# Patient Record
Sex: FEMALE | Race: BLACK OR AFRICAN AMERICAN | NOT HISPANIC OR LATINO | Employment: FULL TIME | URBAN - METROPOLITAN AREA
[De-identification: names, ages, dates, MRNs, and addresses within clinical notes are randomized per-mention and may not be internally consistent; named-entity substitution may affect disease eponyms.]

---

## 2009-10-08 LAB
CHOLEST SERPL-MCNC: 180 MG/DL
HDLC SERPL-MCNC: 53 MG/DL
LDLC SERPL CALC-MCNC: 119 MG/DL
TRIGL SERPL-MCNC: 40 MG/DL

## 2018-06-21 ENCOUNTER — OFFICE VISIT (OUTPATIENT)
Dept: INTERNAL MEDICINE | Facility: CLINIC | Age: 53
End: 2018-06-21
Payer: COMMERCIAL

## 2018-06-21 VITALS
WEIGHT: 199 LBS | DIASTOLIC BLOOD PRESSURE: 81 MMHG | SYSTOLIC BLOOD PRESSURE: 128 MMHG | HEART RATE: 92 BPM | OXYGEN SATURATION: 97 % | RESPIRATION RATE: 20 BRPM

## 2018-06-21 DIAGNOSIS — M54.2 CERVICALGIA: ICD-10-CM

## 2018-06-21 DIAGNOSIS — J47.9 BRONCHIECTASIS WITHOUT COMPLICATION (H): Primary | ICD-10-CM

## 2018-06-21 DIAGNOSIS — Z23 NEED FOR TDAP VACCINATION: ICD-10-CM

## 2018-06-21 DIAGNOSIS — Z13.220 SCREENING FOR HYPERLIPIDEMIA: ICD-10-CM

## 2018-06-21 DIAGNOSIS — I10 BENIGN ESSENTIAL HYPERTENSION: ICD-10-CM

## 2018-06-21 DIAGNOSIS — E89.0 HX OF PARTIAL THYROIDECTOMY: ICD-10-CM

## 2018-06-21 RX ORDER — LISINOPRIL 10 MG/1
10 TABLET ORAL DAILY
Qty: 90 TABLET | Refills: 3 | Status: SHIPPED | OUTPATIENT
Start: 2018-06-21 | End: 2021-01-07

## 2018-06-21 RX ORDER — LISINOPRIL 10 MG/1
10 TABLET ORAL DAILY
COMMUNITY
Start: 2016-03-25 | End: 2018-06-21

## 2018-06-21 RX ORDER — MULTIVITAMIN
1 CAPSULE ORAL PRN
COMMUNITY

## 2018-06-21 RX ORDER — FERROUS SULFATE 325(65) MG
325 TABLET ORAL PRN
COMMUNITY
Start: 2011-02-23

## 2018-06-21 RX ORDER — HYDROCHLOROTHIAZIDE 12.5 MG/1
12.5 CAPSULE ORAL DAILY
Qty: 90 CAPSULE | Refills: 3 | Status: SHIPPED | OUTPATIENT
Start: 2018-06-21

## 2018-06-21 RX ORDER — ALBUTEROL SULFATE 90 UG/1
2 AEROSOL, METERED RESPIRATORY (INHALATION) EVERY 6 HOURS
Qty: 1 INHALER | Refills: 3 | Status: SHIPPED | OUTPATIENT
Start: 2018-06-21

## 2018-06-21 RX ORDER — HYDROCHLOROTHIAZIDE 12.5 MG/1
12.5 CAPSULE ORAL DAILY
COMMUNITY
Start: 2011-02-23 | End: 2018-06-21

## 2018-06-21 ASSESSMENT — ENCOUNTER SYMPTOMS
POSTURAL DYSPNEA: 0
COUGH DISTURBING SLEEP: 0
NECK PAIN: 1
COUGH: 1
HEARTBURN: 1
SPUTUM PRODUCTION: 1
SHORTNESS OF BREATH: 1
BACK PAIN: 1
BLOATING: 1
DYSPNEA ON EXERTION: 1

## 2018-06-21 ASSESSMENT — PAIN SCALES - GENERAL: PAINLEVEL: SEVERE PAIN (7)

## 2018-06-21 NOTE — NURSING NOTE
Chief Complaint   Patient presents with     Establish Care     establish  care/provider- no physical     Referral     want referral to pulmonology   Shiloh Meyer LPN 4:46 PM on 6/21/2018    Patient states has not done anything with Health Maintenance , but has some scheduled.Shiloh Meyer LPN 4:48 PM on 6/21/2018
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
## 2018-06-21 NOTE — MR AVS SNAPSHOT
After Visit Summary   6/21/2018    Sruthi Escobar    MRN: [id number]           Patient Information     Date Of Birth          1965        Visit Information        Provider Department      6/21/2018 4:30 PM Sis Quan, APRN CNP [facility]        Today's Diagnoses     Bronchiectasis without complication (H)    -  1    Cervicalgia        Hx of partial thyroidectomy        Screening for hyperlipidemia        Benign essential hypertension        Need for Tdap vaccination          Care Instructions    Primary Care Center: [phone]     Primary Care Center Medication Refill Request Information:  * Please contact your pharmacy regarding ANY request for medication refills.  ** [facility] Prescription Fax = [phone]  * Please allow 3 business days for routine medication refills.  * Please allow 5 business days for controlled substance medication refills.     Primary Care Center Test Result notification information:  *You will be notified with in 7-10 days of your appointment day regarding the results of your test.  If you are on MyChart you will be notified as soon as the provider has reviewed the results and signed off on them.    To schedule lab appointment [phone].           Follow-ups after your visit        Additional Services     [facility]FERR[facility]       Please call [phone] to make an appointment. Clinic is located: [facility] [address] [place] [address] [id number] [place] [address]      Please complete the following questions:    Procedure/Referral: evaluation and management of left sided neck pain.    What is your diagnosis for the patient's pain? Degenerative spine changes with possible facet involvement x 20 years    What are your specific questions for the pain specialist? Any procedure to relieve pain.    Are there any red flags that may impact the assessment or management of the patient? None             PULMONARY MEDICINE REFERRAL       Your provider has referred you to: [facility]: Lung Disease and Pulmonary Clinic [facility] [phone]   [email or web address]    Please be aware that coverage of these services is subject to the terms and limitations of your health insurance plan.  Call member services at your health plan with any benefit or coverage questions.      Please bring the following with you to your appointment:    (1) Any X-Rays, CTs or MRIs which have been performed.  Contact the facility where they were done to arrange for  prior to your scheduled appointment.    (2) List of current medications   (3) This referral request   (4) Any documents/labs given to you for this referral                  Your next 10 appointments already scheduled     Jun 23, 2018  7:30 AM CDT   LAB with  LAB   [facility] ([facility])    [address] [place] [address]  [address] [facility] [address] [phone]           Please do not eat 10-12 hours before your appointment if you are coming in fasting for labs on lipids, cholesterol, or glucose (sugar). This does not apply to pregnant women. Water, hot tea and black coffee (with nothing added) are okay. Do not drink other fluids, diet soda or chew gum.            Jun 25, 2018  8:20 AM CDT   (Arrive by 8:05 AM)   New Patient Visit with Phuong Naidu MD   [facility] ([facility])    [address] [place] [address]  4th Floor  [facility] [id number] [phone]              Future tests that were ordered for you today     Open Future Orders        Priority Expected Expires Ordered    M Tuberculosis by Quantiferon Routine 6/21/2018 6/21/2019 6/21/2018    Lipid panel reflex to direct LDL Fasting Routine  6/21/2019 6/21/2018    TSH Routine  6/21/2019 6/21/2018            Who to contact     Please call your clinic at [phone]  to:    Ask questions about your health    Make or cancel appointments    Discuss your medicines    Learn about your test results    Speak to your doctor            Additional Information About Your Visit        [employer] Information     [employer] is an electronic gateway that provides easy, online access to your medical records. With [employer], you can request a clinic appointment, read your test results, renew a prescription or communicate with your care team.     To sign up for [employer] visit the website at [email or web address]   You will be asked to enter the access code listed below, as well as some personal information. Please follow the directions to create your username and password.     Your access code is: [email or web address]  Expires: 2018  6:30 AM     Your access code will  in 90 days. If you need help or a new code, please contact your [facility] or call [phone] for assistance.        Care EveryWhere ID     This is your Care EveryWhere ID. This could be used by other organizations to access your [place] medical records  [id number]        Your Vitals Were     Pulse Respirations Pulse Oximetry             92 20 97%          Blood Pressure from Last 3 Encounters:   18 128/81    Weight from Last 3 Encounters:   18 90.3 kg (199 lb)              We Performed the Following     [facility] REFERRAL     PNEUMOCOCCAL VACCINE,ADULT,SQ OR IM     PULMONARY MEDICINE REFERRAL     TDAP VACCINE (BOOSTRIX)          Today's Medication Changes          These changes are accurate as of 18  6:26 PM.  If you have any questions, ask your nurse or doctor.               Start taking these medicines.        Dose/Directions    albuterol 108 (90 Base) MCG/ACT Inhaler   Commonly known as:  PROAIR HFA/PROVENTIL HFA/VENTOLIN HFA   Used for:  Bronchiectasis without complication (H)   Started by:  Sis Quan APRN CNP        Dose:  2 puff    Inhale 2 puffs into the lungs every 6 hours   Quantity:  1 Inhaler   Refills:  3            Where to get your medicines      These medications were sent to [facility] [address] [address]  [place] [address]  [address] [facility] [address]     Phone:  [phone]     albuterol 108 (90 Base) MCG/ACT Inhaler    hydrochlorothiazide 12.5 MG capsule    lisinopril 10 MG tablet                Primary Care Provider    None Specified       No primary provider on file.        Equal Access to Services     [facility]: Hadii shoshana jarquin hadasho Soanastacio, waaxda luqadaha, qaybta kaalmada adestanyaroyal, mannie lino . So [facility] [place] [phone].    ATENCIÓN: Si habla español, tiene a lovell disposición servicios gratuitos de asistencia lingüística. [facility] [phone].    We comply with applicable federal civil rights laws and [place] laws. We do not discriminate on the basis of race, color, national origin, age, disability, sex, sexual orientation, or gender identity.            Thank you!     Thank you for choosing [facility] PRIMARY [facility]ARE CLIN[facility]  for your care. Our goal is always to provide you with excellent care. Hearing back from our patients is one way we can continue to improve our services. Please take a few minutes to complete the written survey that you may receive in the mail after your visit with us. Thank you!             Your Updated Medication List - Protect others around you: Learn how to safely use, store and throw away your medicines at www.disposemymeds.org.          This list is accurate as of 6/21/18  6:26 PM.  Always use your most recent med list.                   Brand Name Dispense Instructions for use Diagnosis    albuterol 108 (90 Base) MCG/ACT Inhaler    PROAIR HFA/PROVENTIL HFA/VENTOLIN HFA    1 Inhaler    Inhale 2 puffs into the lungs every 6 hours    Bronchiectasis without complication (H)       calcium-vitamin D 600-400  MG-UNIT per tablet    CALTRATE     Take 1 tablet by mouth daily        CVS IRON 325 (65 Fe) MG tablet   Generic drug:  ferrous sulfate      Take 325 mg by mouth daily        hydrochlorothiazide 12.5 MG capsule    MICROZIDE    90 capsule    Take 1 capsule (12.5 mg) by mouth daily    Benign essential hypertension       lisinopril 10 MG tablet    PRINIVIL/ZESTRIL    90 tablet    Take 1 tablet (10 mg) by mouth daily    Benign essential hypertension       MULTIvitamin  S Caps      Take 1 capsule by mouth daily

## 2018-06-22 NOTE — TELEPHONE ENCOUNTER
FUTURE VISIT INFORMATION      FUTURE VISIT INFORMATION:    Date: 6/25/18    Time:     Location: [facility]  REFERRAL INFORMATION:    Referring provider:  Sis Quan    Referring providers clinic:  [facility]    Reason for visit/diagnosis  Cervicalgia, per pt with [facility], records and referral in Epic    RECORDS REQUESTED FROM:       Clinic name Comments Records Status Imaging Status    Sis Quan referring  internal internal                                   RECORDS STATUS

## 2018-06-25 ENCOUNTER — PRE VISIT (OUTPATIENT)
Dept: ANESTHESIOLOGY | Facility: CLINIC | Age: 53
End: 2018-06-25

## 2018-06-29 DIAGNOSIS — Z13.220 SCREENING FOR HYPERLIPIDEMIA: ICD-10-CM

## 2018-06-29 DIAGNOSIS — J47.9 BRONCHIECTASIS WITHOUT COMPLICATION (H): ICD-10-CM

## 2018-06-29 DIAGNOSIS — E89.0 HX OF PARTIAL THYROIDECTOMY: ICD-10-CM

## 2018-06-29 LAB
CHOLEST SERPL-MCNC: 216 MG/DL
HDLC SERPL-MCNC: 70 MG/DL
LDLC SERPL CALC-MCNC: 136 MG/DL
NONHDLC SERPL-MCNC: 146 MG/DL
TRIGL SERPL-MCNC: 50 MG/DL
TSH SERPL DL<=0.005 MIU/L-ACNC: 1.51 MU/L (ref 0.4–4)

## 2018-07-02 LAB
M TB TUBERC IFN-G BLD QL: NEGATIVE
M TB TUBERC IFN-G/MITOGEN IGNF BLD: 0.07 IU/ML

## 2018-07-10 ENCOUNTER — TELEPHONE (OUTPATIENT)
Dept: INTERNAL MEDICINE | Facility: CLINIC | Age: 53
End: 2018-07-10

## 2018-07-10 NOTE — TELEPHONE ENCOUNTER
"[facility] Call Center    Phone Message    May a detailed message be left on voicemail: yes    Reason for Call: Requesting Results   Name/type of test: Labs; pt states she does not have the results of the \"quantiferon\" test, and needs the results of all labs printed and mailed to her so she can have them for work/school.  Date of test: 6/29/2018  Was test done at a location other than [facility] (Please fill in the location if not [facility])?: No    Action Taken: Message routed to:  [facility] ([facility]): BENNIE  "

## 2018-07-10 NOTE — LETTER
Patient:  Sruthi Escobar  :   1965  MRN:     [id number]        Ms. Sruthi Escobar  [address] [address]        July 10, 2018    Dear Ms. Escobar,    Thank you for choosing the [facility] for your healthcare needs.  We appreciate the opportunity to serve you.    The following are your recent test results.     Results for orders placed or performed in visit on 18   M Tuberculosis by Quantiferon   Result Value Ref Range    M Tuberculosis Result Negative NEG^Negative    M Tuberculosis Antigen Value 0.07 IU/mL   Lipid panel reflex to direct LDL Fasting   Result Value Ref Range    Cholesterol 216 (H) <200 mg/dL    Triglycerides 50 <150 mg/dL    HDL Cholesterol 70 >49 mg/dL    LDL Cholesterol Calculated 136 (H) <100 mg/dL    Non HDL Cholesterol 146 (H) <130 mg/dL   TSH   Result Value Ref Range    TSH 1.51 0.40 - 4.00 mU/L             Please contact your provider if you have any questions or concerns.  We look forward to serving your needs in the future.      Sincerely,        Sis Quan MS, RN, CT, ANP-BC, Adult Nurse Practitioner  sr

## 2018-11-15 DIAGNOSIS — R91.8 LUNG NODULES: Primary | ICD-10-CM

## 2018-11-17 NOTE — TELEPHONE ENCOUNTER
RECORDS STATUS - ALL OTHER DIAGNOSIS      RECORDS RECEIVED FROM: Pending   DATE RECEIVED: [place]   NOTES STATUS DETAILS   OFFICE NOTE from referring provider Complete CE   OFFICE NOTE from medical oncologist     DISCHARGE SUMMARY from hospital Complete CE   DISCHARGE REPORT from the ER     OPERATIVE REPORT     MEDICATION LIST     CLINICAL TRIAL TREATMENTS TO DATE     LABS     PATHOLOGY REPORTS     ANYTHING RELATED TO DIAGNOSIS     GENONOMIC TESTING     TYPE:     IMAGING (NEED IMAGES & REPORT)     CT SCANS Pending [facility]   MRI     MAMMO     ULTRASOUND     PET

## 2018-11-20 ENCOUNTER — PATIENT OUTREACH (OUTPATIENT)
Dept: CARE COORDINATION | Facility: CLINIC | Age: 53
End: 2018-11-20

## 2018-11-20 NOTE — TELEPHONE ENCOUNTER
Cindy from [facility] has  Pushed   records into PACS - name hyphenated in pacs- Sruthi Bruno A ( as seen in PACS )

## 2018-11-21 ENCOUNTER — OFFICE VISIT (OUTPATIENT)
Dept: PULMONOLOGY | Facility: CLINIC | Age: 53
End: 2018-11-21
Attending: INTERNAL MEDICINE
Payer: COMMERCIAL

## 2018-11-21 ENCOUNTER — PRE VISIT (OUTPATIENT)
Dept: PULMONOLOGY | Facility: CLINIC | Age: 53
End: 2018-11-21

## 2018-11-21 ENCOUNTER — RADIANT APPOINTMENT (OUTPATIENT)
Dept: CT IMAGING | Facility: CLINIC | Age: 53
End: 2018-11-21
Attending: CLINICAL NURSE SPECIALIST
Payer: COMMERCIAL

## 2018-11-21 VITALS
RESPIRATION RATE: 16 BRPM | BODY MASS INDEX: 34.38 KG/M2 | OXYGEN SATURATION: 99 % | HEIGHT: 63 IN | DIASTOLIC BLOOD PRESSURE: 88 MMHG | TEMPERATURE: 98.6 F | SYSTOLIC BLOOD PRESSURE: 157 MMHG | WEIGHT: 194 LBS | HEART RATE: 83 BPM

## 2018-11-21 DIAGNOSIS — J20.9 ACUTE BRONCHITIS, UNSPECIFIED ORGANISM: Primary | ICD-10-CM

## 2018-11-21 DIAGNOSIS — D72.819 CHRONIC LEUKOPENIA: ICD-10-CM

## 2018-11-21 DIAGNOSIS — R91.8 LUNG NODULES: ICD-10-CM

## 2018-11-21 DIAGNOSIS — J20.9 ACUTE BRONCHITIS, UNSPECIFIED ORGANISM: ICD-10-CM

## 2018-11-21 LAB
BACTERIA SPEC CULT: ABNORMAL
BASOPHILS # BLD AUTO: 0 10E9/L (ref 0–0.2)
BASOPHILS NFR BLD AUTO: 0.7 %
CRP SERPL-MCNC: <2.9 MG/L (ref 0–8)
DIFFERENTIAL METHOD BLD: ABNORMAL
EOSINOPHIL # BLD AUTO: 0.1 10E9/L (ref 0–0.7)
EOSINOPHIL NFR BLD AUTO: 1.4 %
ERYTHROCYTE [DISTWIDTH] IN BLOOD BY AUTOMATED COUNT: 14.3 % (ref 10–15)
ERYTHROCYTE [SEDIMENTATION RATE] IN BLOOD BY WESTERGREN METHOD: 28 MM/H (ref 0–30)
GRAM STN SPEC: ABNORMAL
HCT VFR BLD AUTO: 39.2 % (ref 35–47)
HGB BLD-MCNC: 12.3 G/DL (ref 11.7–15.7)
IMM GRANULOCYTES # BLD: 0 10E9/L (ref 0–0.4)
IMM GRANULOCYTES NFR BLD: 0.2 %
LYMPHOCYTES # BLD AUTO: 2.2 10E9/L (ref 0.8–5.3)
LYMPHOCYTES NFR BLD AUTO: 53.3 %
Lab: ABNORMAL
MCH RBC QN AUTO: 26.6 PG (ref 26.5–33)
MCHC RBC AUTO-ENTMCNC: 31.4 G/DL (ref 31.5–36.5)
MCV RBC AUTO: 85 FL (ref 78–100)
MONOCYTES # BLD AUTO: 0.4 10E9/L (ref 0–1.3)
MONOCYTES NFR BLD AUTO: 10.4 %
NEUTROPHILS # BLD AUTO: 1.4 10E9/L (ref 1.6–8.3)
NEUTROPHILS NFR BLD AUTO: 34 %
NRBC # BLD AUTO: 0 10*3/UL
NRBC BLD AUTO-RTO: 0 /100
PLATELET # BLD AUTO: 232 10E9/L (ref 150–450)
RBC # BLD AUTO: 4.62 10E12/L (ref 3.8–5.2)
SPECIMEN SOURCE: ABNORMAL
SPECIMEN SOURCE: ABNORMAL
WBC # BLD AUTO: 4.2 10E9/L (ref 4–11)

## 2018-11-21 PROCEDURE — G0463 HOSPITAL OUTPT CLINIC VISIT: HCPCS | Mod: ZF

## 2018-11-21 PROCEDURE — 82784 ASSAY IGA/IGD/IGG/IGM EACH: CPT

## 2018-11-21 PROCEDURE — 87070 CULTURE OTHR SPECIMN AEROBIC: CPT

## 2018-11-21 PROCEDURE — 82784 ASSAY IGA/IGD/IGG/IGM EACH: CPT | Performed by: INTERNAL MEDICINE

## 2018-11-21 PROCEDURE — 86140 C-REACTIVE PROTEIN: CPT

## 2018-11-21 PROCEDURE — 82787 IGG 1 2 3 OR 4 EACH: CPT | Performed by: INTERNAL MEDICINE

## 2018-11-21 PROCEDURE — 85652 RBC SED RATE AUTOMATED: CPT

## 2018-11-21 PROCEDURE — 87102 FUNGUS ISOLATION CULTURE: CPT | Performed by: INTERNAL MEDICINE

## 2018-11-21 PROCEDURE — 87205 SMEAR GRAM STAIN: CPT

## 2018-11-21 PROCEDURE — 87210 SMEAR WET MOUNT SALINE/INK: CPT | Performed by: INTERNAL MEDICINE

## 2018-11-21 PROCEDURE — 82785 ASSAY OF IGE: CPT

## 2018-11-21 PROCEDURE — 85025 COMPLETE CBC W/AUTO DIFF WBC: CPT

## 2018-11-21 ASSESSMENT — PAIN SCALES - GENERAL: PAINLEVEL: MODERATE PAIN (5)

## 2018-11-21 NOTE — NURSING NOTE
"Oncology Rooming Note    November 21, 2018 2:41 PM   Sruthi Escobar is a 53 year old female who presents for:    Chief Complaint   Patient presents with     Oncology Clinic Visit     New patient; Lung Nodule     Initial Vitals: /88  Pulse 83  Temp 98.6  F (37  C) (Oral)  Resp 16  Ht 1.6 m (5' 3\")  Wt 88 kg (194 lb)  SpO2 99%  BMI 34.37 kg/m2 Estimated body mass index is 34.37 kg/(m^2) as calculated from the following:    Height as of this encounter: 1.6 m (5' 3\").    Weight as of this encounter: 88 kg (194 lb). Body surface area is 1.98 meters squared.  Moderate Pain (5) Comment: left neck and upper back   No LMP recorded. Patient is postmenopausal.  Allergies reviewed: Yes  Medications reviewed: Yes    Medications: Medication refills not needed today.  Pharmacy name entered into [employer]: [facility] [address] [place] [address]  AT [facility] [address]    Clinical concerns: lung nodule; CT scan result     6 minutes for nursing intake (face to face time)     Tamia Angeles CMA              "

## 2018-11-21 NOTE — PATIENT INSTRUCTIONS
Colorectal Cancer Screening: During our visit today, we discussed that it is recommended you receive colorectal cancer screening. Please call or make an appointment with your primary care provider to discuss this. You may also call the [employer] scheduling line ([phone]) to set up a colonoscopy appointment.

## 2018-11-21 NOTE — LETTER
11/21/2018       RE: Sruthi Escobar  [address] [address]     Dear Colleague,    Thank you for referring your patient, Sruthi Escobar, to the [facility] at [facility]. Please see a copy of my visit note below.    [facility]  Lung Nodule Clinic Note  November 21, 2018    Chief complaint:  Sruthi Escobar is a 53 year old female seen in the Pulmonary Clinic  for   Chief Complaint   Patient presents with     Oncology Clinic Visit     New patient; Lung Nodule     Assessment and Plan:  1. right lower lobe pleural-based nodular density which has been stable when compared to previous CT scans.  There is also a cystic lesion in the right lower lobe pleural-based which also has not changed in size and since 2016.  2.  Frequent upper respiratory tract infections requiring antibiotic treatment averaging once a month for the last couple of years.  I will order immunoglobulin levels, ESR, CRP.  Patient also has low hemoglobin and WBC chronically.  She has never been worked up for this.  I will repeat CBC and sent her to hematology for further evaluation most likely.  Also ordered sputum cultures, Gram stain KOH prep.      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  I reviewed her CT scan with her that revealed no chronic changes such as bronchiectasis.  She was most recently treated with azithromycin and Keflex for her upper respiratory tract infection and she tells me that whenever she sees a patient with infection she catches.  She does not have any other infections and any other systems such as urinary tract infection no history of opportunistic infections in the past.    Exposure history: Asbestos;  No , TB;  No , Radiation;   No     Allergies   Allergen Reactions     Prochlorperazine Anxiety and Itching      Phenothiazines Rash     Sulfa Drugs Rash        Past Medical History:   Diagnosis Date     Bronchiectasis (H)      Gastroesophageal reflux disease      Hypertension      Pain in shoulder region, left         No past surgical history on file.     Social History     Social History     Marital status:      Spouse name: N/A     Number of children: N/A     Years of education: N/A     Occupational History     Not on file.     Social History Main Topics     Smoking status: Never Smoker     Smokeless tobacco: Never Used     Alcohol use No     Drug use: No     Sexual activity: Yes     Partners: Male     Other Topics Concern     Not on file     Social History Narrative        Family History   Problem Relation Age of Onset     Hypertension Mother      Uterine Cancer Mother      HEART DISEASE Father      Hypertension Maternal Grandmother      HEART DISEASE Maternal Grandfather         Immunization History   Administered Date(s) Administered     HepA, Unspecified 03/05/2012     MMR 11/01/1993     Pneumococcal 23 valent 06/21/2018     TDAP Vaccine (Boostrix) 06/21/2018       Current Outpatient Prescriptions   Medication Sig     calcium-vitamin D (CALTRATE) 600-400 MG-UNIT per tablet Take 1 tablet by mouth daily     ferrous sulfate (CVS IRON) 325 (65 Fe) MG tablet Take 325 mg by mouth daily     hydrochlorothiazide (MICROZIDE) 12.5 MG capsule Take 1 capsule (12.5 mg) by mouth daily     lisinopril (PRINIVIL/ZESTRIL) 10 MG tablet Take 1 tablet (10 mg) by mouth daily     Multiple Vitamin (MULTIVITAMIN  S) CAPS Take 1 capsule by mouth daily     albuterol (PROAIR HFA/PROVENTIL HFA/VENTOLIN HFA) 108 (90 Base) MCG/ACT Inhaler Inhale 2 puffs into the lungs every 6 hours (Patient not taking: Reported on 11/21/2018)     No current facility-administered medications for this visit.         Review of Systems:  I have done 10 points of review systems and pertinent findings are cough  ,otherwise negative.    Physical  examination  Constitutional: Alert, oriented, not in distress  Vitals: B/P: 157/88, T: 98.6, P: 83, R: 16  Eyes: No icterus, nystagmus, pupils isocoric  ENT/Mouth:  No ear discharge, moist mucosa, no ulceration, tonsillar hypertrophy  Cardiovascular: Normal S1 and S2, no additional heart sounds, murmur, rub, normal peripheral pulses  Respiratory: Both hemithoraces are symmetrical, normal to palpation, no dullness to percussion, auscultation of lungs revealed decreased bronchovesicular sounds, expirium prolongation, wheezing, rhonci and crackles  Gastrointestinal/Rectal: Bowel sounds present, soft, non tender, non distended, no organomegaly, ascitis, mass  Musculoskeletal: Normal muscle mass, no dephormity  Integumentary:  No rash, normal texture and turgor, no edema  Neurological: Alert, orientedx3, no motor deficits, cranial nerves grossly normal  Psychiatric:  Mood and affect are appropriate with insight into his/her medical condition  Hematologic/Immunologic/Lymphatic: No bruise, no lymph node enargement       Thank you for allowing me participate in the care of Sruthi Escobar.    NATTY Barragan MD

## 2018-11-21 NOTE — MR AVS SNAPSHOT
After Visit Summary   11/21/2018    Sruthi Escobar    MRN: [id number]           Patient Information     Date Of Birth          1965        Visit Information        Provider Department      11/21/2018 2:40 PM Louis Barragan MD [facility]        Today's Diagnoses     Acute bronchitis, unspecified organism    -  1    Chronic leukopenia          Care Instructions    Colorectal Cancer Screening: During our visit today, we discussed that it is recommended you receive colorectal cancer screening. Please call or make an appointment with your primary care provider to discuss this. You may also call the [facility] scheduling line ([phone]) to set up a colonoscopy appointment.            Follow-ups after your visit        Additional Services     ONC/HEME ADULT REFERRAL       Your provider has referred you to: [facility]: Cancer Care/Hematology (All Cancer Related Services) - [address] [phone]   [email or web address]    Please be aware that coverage of these services is subject to the terms and limitations of your health insurance plan.  Call member services at your health plan with any benefit or coverage questions.      Please bring the following with you to your appointment:    (1) Any X-Rays, CTs or MRIs which have been performed.  Contact the facility where they were done to arrange for  prior to your scheduled appointment.   (2) List of current medications  (3) This referral request   (4) Any documents/labs given to you for this referral                  Who to contact     If you have questions or need follow up information about today's clinic visit or your schedule please contact [facility] directly at [phone].  Normal or non-critical lab and imaging results will be communicated to you by MyChart, letter or phone within 4 business days after the clinic has received the results. If you do  "not hear from us within 7 days, please contact the clinic through [employer] or phone. If you have a critical or abnormal lab result, we will notify you by phone as soon as possible.  Submit refill requests through [employer] or call your pharmacy and they will forward the refill request to us. Please allow 3 business days for your refill to be completed.          Additional Information About Your Visit        [employer] Information     [employer] lets you send messages to your doctor, view your test results, renew your prescriptions, schedule appointments and more. To sign up, go to [email or web address] . Click on \"Log in\" on the left side of the screen, which will take you to the Welcome page. Then click on \"Sign up Now\" on the right side of the page.     You will be asked to enter the access code listed below, as well as some personal information. Please follow the directions to create your username and password.     Your access code is: [email or web address]  Expires: 2019  3:00 PM     Your access code will  in 90 days. If you need help or a new code, please call your [place] clinic or [phone].        Care EveryWhere ID     This is your Care EveryWhere ID. This could be used by other organizations to access your [place] medical records  [id number]        Your Vitals Were     Pulse Temperature Respirations Height Pulse Oximetry BMI (Body Mass Index)    83 98.6  F (37  C) (Oral) 16 1.6 m (5' 3\") 99% 34.37 kg/m2       Blood Pressure from Last 3 Encounters:   18 157/88   18 128/81    Weight from Last 3 Encounters:   18 88 kg (194 lb)   18 90.3 kg (199 lb)              We Performed the Following     Fungus Culture, non-blood     Immunoglobulin G subclasses     Koh prep     ONC/HEME ADULT REFERRAL        Primary Care Provider    None Specified       No primary provider on file.        Equal Access to Services     SALLIE AHNSON AH: Joanna Corado, enma bennett, joe lang " mannie lynnstan onofreaan ah. So [facility] [phone].    ATENCIÓN: Si habla jaime, tiene a lovell disposición servicios gratuitos de asistencia lingüística. Marcia al [phone].    We comply with applicable federal civil rights laws and [place] laws. We do not discriminate on the basis of race, color, national origin, age, disability, sex, sexual orientation, or gender identity.            Thank you!     Thank you for choosing [facility]IN[facility]  for your care. Our goal is always to provide you with excellent care. Hearing back from our patients is one way we can continue to improve our services. Please take a few minutes to complete the written survey that you may receive in the mail after your visit with us. Thank you!             Your Updated Medication List - Protect others around you: Learn how to safely use, store and throw away your medicines at www.disposemymeds.org.          This list is accurate as of 11/21/18 11:59 PM.  Always use your most recent med list.                   Brand Name Dispense Instructions for use Diagnosis    albuterol 108 (90 Base) MCG/ACT inhaler    PROAIR HFA/PROVENTIL HFA/VENTOLIN HFA    1 Inhaler    Inhale 2 puffs into the lungs every 6 hours    Bronchiectasis without complication (H)       calcium carbonate 600 mg-vitamin D 400 units 600-400 MG-UNIT per tablet    CALTRATE     Take 1 tablet by mouth daily        CVS IRON 325 (65 Fe) MG tablet   Generic drug:  ferrous sulfate      Take 325 mg by mouth daily        hydrochlorothiazide 12.5 MG capsule    MICROZIDE    90 capsule    Take 1 capsule (12.5 mg) by mouth daily    Benign essential hypertension       lisinopril 10 MG tablet    PRINIVIL/ZESTRIL    90 tablet    Take 1 tablet (10 mg) by mouth daily    Benign essential hypertension       MULTIvitamin  S capsule      Take 1 capsule by mouth daily

## 2018-11-21 NOTE — PROGRESS NOTES
[facility]  Lung Nodule Clinic Note  November 21, 2018    Chief complaint:  Sruthi Escobar is a 53 year old female seen in the Pulmonary Clinic  for   Chief Complaint   Patient presents with     Oncology Clinic Visit     New patient; Lung Nodule     Assessment and Plan:  1. right lower lobe pleural-based nodular density which has been stable when compared to previous CT scans.  There is also a cystic lesion in the right lower lobe pleural-based which also has not changed in size and since 2016.  2.  Frequent upper respiratory tract infections requiring antibiotic treatment averaging once a month for the last couple of years.  I will order immunoglobulin levels, ESR, CRP.  Patient also has low hemoglobin and WBC chronically.  She has never been worked up for this.  I will repeat CBC and sent her to hematology for further evaluation most likely.  Also ordered sputum cultures, Gram stain KOH prep.      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  I reviewed her CT scan with her that revealed no chronic changes such as bronchiectasis.  She was most recently treated with azithromycin and Keflex for her upper respiratory tract infection and she tells me that whenever she sees a patient with infection she catches.  She does not have any other infections and any other systems such as urinary tract infection no history of opportunistic infections in the past.    Exposure history: Asbestos;  No , TB;  No , Radiation;   No     Allergies   Allergen Reactions     Prochlorperazine Anxiety and Itching     Phenothiazines Rash     Sulfa Drugs Rash        Past Medical History:   Diagnosis Date     Bronchiectasis (H)      Gastroesophageal reflux disease      Hypertension      Pain in shoulder region, left         No past surgical history on file.     Social History     Social History     Marital  status:      Spouse name: N/A     Number of children: N/A     Years of education: N/A     Occupational History     Not on file.     Social History Main Topics     Smoking status: Never Smoker     Smokeless tobacco: Never Used     Alcohol use No     Drug use: No     Sexual activity: Yes     Partners: Male     Other Topics Concern     Not on file     Social History Narrative        Family History   Problem Relation Age of Onset     Hypertension Mother      Uterine Cancer Mother      HEART DISEASE Father      Hypertension Maternal Grandmother      HEART DISEASE Maternal Grandfather         Immunization History   Administered Date(s) Administered     HepA, Unspecified 03/05/2012     MMR 11/01/1993     Pneumococcal 23 valent 06/21/2018     TDAP Vaccine (Boostrix) 06/21/2018       Current Outpatient Prescriptions   Medication Sig     calcium-vitamin D (CALTRATE) 600-400 MG-UNIT per tablet Take 1 tablet by mouth daily     ferrous sulfate (CVS IRON) 325 (65 Fe) MG tablet Take 325 mg by mouth daily     hydrochlorothiazide (MICROZIDE) 12.5 MG capsule Take 1 capsule (12.5 mg) by mouth daily     lisinopril (PRINIVIL/ZESTRIL) 10 MG tablet Take 1 tablet (10 mg) by mouth daily     Multiple Vitamin (MULTIVITAMIN  S) CAPS Take 1 capsule by mouth daily     albuterol (PROAIR HFA/PROVENTIL HFA/VENTOLIN HFA) 108 (90 Base) MCG/ACT Inhaler Inhale 2 puffs into the lungs every 6 hours (Patient not taking: Reported on 11/21/2018)     No current facility-administered medications for this visit.         Review of Systems:  I have done 10 points of review systems and pertinent findings are cough  ,otherwise negative.    Physical examination  Constitutional: Alert, oriented, not in distress  Vitals: B/P: 157/88, T: 98.6, P: 83, R: 16  Eyes: No icterus, nystagmus, pupils isocoric  ENT/Mouth:  No ear discharge, moist mucosa, no ulceration, tonsillar hypertrophy  Cardiovascular: Normal S1 and S2, no additional heart sounds, murmur, rub,  normal peripheral pulses  Respiratory: Both hemithoraces are symmetrical, normal to palpation, no dullness to percussion, auscultation of lungs revealed decreased bronchovesicular sounds, expirium prolongation, wheezing, rhonci and crackles  Gastrointestinal/Rectal: Bowel sounds present, soft, non tender, non distended, no organomegaly, ascitis, mass  Musculoskeletal: Normal muscle mass, no dephormity  Integumentary:  No rash, normal texture and turgor, no edema  Neurological: Alert, orientedx3, no motor deficits, cranial nerves grossly normal  Psychiatric:  Mood and affect are appropriate with insight into his/her medical condition  Hematologic/Immunologic/Lymphatic: No bruise, no lymph node enargement       Thank you for allowing me participate in the care of Sruthi Escobar.    NATTY Barragan MD

## 2018-11-22 LAB
KOH PREP SPEC: NORMAL
SPECIMEN SOURCE: NORMAL

## 2018-11-23 LAB
FUNGUS SPEC CULT: ABNORMAL
FUNGUS SPEC CULT: ABNORMAL
IGA SERPL-MCNC: 215 MG/DL (ref 70–380)
IGE SERPL-ACNC: 16 KIU/L (ref 0–114)
IGG SERPL-MCNC: 1620 MG/DL (ref 695–1620)
IGM SERPL-MCNC: 90 MG/DL (ref 60–265)
SPECIMEN SOURCE: ABNORMAL

## 2018-11-26 ENCOUNTER — TELEPHONE (OUTPATIENT)
Dept: ONCOLOGY | Facility: CLINIC | Age: 53
End: 2018-11-26

## 2018-11-26 ENCOUNTER — TELEPHONE (OUTPATIENT)
Dept: SURGERY | Facility: CLINIC | Age: 53
End: 2018-11-26

## 2018-11-26 NOTE — TELEPHONE ENCOUNTER
Sruthi was called and notified of the the upcoming appointment with Dr. Saucedo.  We reviewed Dr. Barragan's recommendations and lab results

## 2018-11-26 NOTE — TELEPHONE ENCOUNTER
----- Message from Maria Teresa Garcia sent at 11/26/2018  7:44 AM CST -----  Please respond to Dr. Barragan and Rachel Russell.    ThanksHelena  ----- Message -----     From: Louis Barragan MD     Sent: 11/25/2018   9:31 PM       To: Rachel Russell, APRN CNS, #    Hi,  Can you please schedule her w Hematology (initial new patient, diagnosis: leukopenia) and let her know?  Thankslakisha

## 2018-11-27 LAB
IGG SERPL-MCNC: 1680 MG/DL (ref 695–1620)
IGG1 SER-MCNC: 773 MG/DL (ref 300–856)
IGG2 SER-MCNC: 865 MG/DL (ref 158–761)
IGG3 SER-MCNC: 50 MG/DL (ref 24–192)
IGG4 SER-MCNC: 29 MG/DL (ref 11–86)

## 2018-12-17 NOTE — PROGRESS NOTES
[facility]  HEMATOLOGY AND MEDICAL ONCOLOGY    CONSULTATION    PATIENT NAME: Sruthi Escobar   MRN# [id number]     Date of Visit: Dec 18, 2018    Referring Provider: Louis Barragan MD  [address]  [place] [address] YOB: 1965     Reason for consult: leukopenia    CHIEF COMPLAINT   Frequent infections     HISTORY OF PRESENTING ILLNESS     Mrs. Escobar is a 52yo woman who is referred for work up of leukopenia. 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 She states that she gets frequent upper respiratory tract infections (URIs) when she encounters patients with URIs. She was recently seen by the pulmonary service who requested a hematology consult. Per pulmonary note she has chronic anemia and leukopenia. Lab review from 11/21/2018 with WBC 4.2x10^3/uL, Hemoglobin 12.3g/dL, platelet 232 x10^3/uL, MCV 85fL, absolute neutrophils 1.4x10^3/uL, normal ESR 28mm/hr, normal ESR <2.9mg/L and normal antibody levels. Lab review from 2015 with WBC 3.6, ANC 2.5x10^3/uL and ALC 0.9x10^3/uL. CT chest on 11/21/2018 with mild central bronchiectasis, few scattered pulmonary nodules and a right middle lobe cyst.     12/18/2018: She presents to clinic for first appointment. She feels well. She takes augmentin for a sinus infection which manifested with nasal congestion, fever and fatigue. She denies any cervical or axillary lymphadenopathy. She reports that she is getting frequent URI almost every 1-3 moths since 2016. She reports chronic anemia for the last 20 years because of low iron. She reports that the anemia improved with oral iron ferrous sulfate 325mg BID. She used to have heavy periods. She is currently in menopause  She denies any prior admissions to the hospital for IV antibiotics. She is originally from [facility] [place]. Her son was recently diagnosed with beta thalassemia trait       PAST MEDICAL HISTORY     Past Medical History:   Diagnosis  Date     Bronchiectasis (H)      Gastroesophageal reflux disease      Hypertension      Pain in shoulder region, left         PAST SURGICAL HISTORY     Partial thyroidectomy  2-c sections        CURRENT OUTPATIENT MEDICATIONS     Current Outpatient Medications   Medication Sig     albuterol (PROAIR HFA/PROVENTIL HFA/VENTOLIN HFA) 108 (90 Base) MCG/ACT Inhaler Inhale 2 puffs into the lungs every 6 hours (Patient not taking: Reported on 11/21/2018)     calcium-vitamin D (CALTRATE) 600-400 MG-UNIT per tablet Take 1 tablet by mouth daily     ferrous sulfate (CVS IRON) 325 (65 Fe) MG tablet Take 325 mg by mouth daily     hydrochlorothiazide (MICROZIDE) 12.5 MG capsule Take 1 capsule (12.5 mg) by mouth daily     lisinopril (PRINIVIL/ZESTRIL) 10 MG tablet Take 1 tablet (10 mg) by mouth daily     Multiple Vitamin (MULTIVITAMIN  S) CAPS Take 1 capsule by mouth daily     No current facility-administered medications for this visit.         ALLERGIES     Allergies   Allergen Reactions     Prochlorperazine Anxiety and Itching     Phenothiazines Rash     Sulfa Drugs Rash     .     SOCIAL HISTORY     Social History     Socioeconomic History     Marital status:      Spouse name: Not on file     Number of children: Not on file     Years of education: Not on file     Highest education level: Not on file   Social Needs     Financial resource strain: Not on file     Food insecurity - worry: Not on file     Food insecurity - inability: Not on file     Transportation needs - medical: Not on file     Transportation needs - non-medical: Not on file   Occupational History     Not on file   Tobacco Use     Smoking status: Never Smoker     Smokeless tobacco: Never Used   Substance and Sexual Activity     Alcohol use: No     Drug use: No     Sexual activity: Yes     Partners: Male   Other Topics Concern     Not on file   Social History Narrative     Not on file          FAMILY HISTORY     Family History   Problem Relation Age of Onset  "    Hypertension Mother      Uterine Cancer Mother      Heart Disease Father      Hypertension Maternal Grandmother      Heart Disease Maternal Grandfather           REVIEW OF SYSTEMS   Pertinent positives have been included in HPI;  Review Of Systems  General: as per hpi  Skin: negative for rash  Eyes: negative for visual blurring  Ears/Nose/Throat: negative for hearing loss  Respiratory: No shortness of breath, dyspnea on exertion, cough, or hemoptysis  Cardiovascular: negative for palpitations and tachycardia  Gastrointestinal: negative for nausea, vomiting and abdominal pain  Genitourinary: negative for nocturia and dysuria  Musculoskeletal: negative for muscular pain or bone pain  Neurologic: negative for migraine headaches  Psychiatric: negative for anxiety  Hematologic/Lymphatic/Immunologic: as per hpi  Endocrine: hx of goiter-no current symptoms of hypothyroidism       PHYSICAL EXAM   /89   Pulse 88   Temp 99.1  F (37.3  C) (Oral)   Resp 16   Ht 1.6 m (5' 3\")   Wt 87.5 kg (193 lb)   SpO2 99%   BMI 34.19 kg/m    General appearance: pleasant woman, not in acute distress  Eyes, Ears, Nose, Throat & Mouth:pupils equal and reactive to light and accommodation, extraocular movements intact, no icterus, injection or pallor. Oropharynx is clear.  Neck: supple, see hem  Respiratory: clear to auscultation bilaterally  Cardiovascular: regular, no murmurs, rubs, or gallops  Gastrointenstinal: soft, non-tender, non-distended, normal bowel sounds, no hepatosplenomegaly  Extremities: warm, well perfused, no edema  Neurologic: Alert and oriented to person, place and time, Cranial nerves 2-12 intact, intact sensation to light touch, muscle strength 5/5 in 4 extremities, reflexes +2   Skin: no rash  Hematologic/Lymph: no cervical, axillary or inguinal lymphadenopathy     LABORATORY AND IMAGING STUDIES       Recent Labs   Lab Test 11/21/18  1539   WBC 4.2   RBC 4.62   HGB 12.3   HCT 39.2   MCV 85   MCH 26.6   MCHC " 31.4*   RDW 14.3      NEUTROPHIL 34.0   ANEU 1.4*   ALYM 2.2   CHUYITA 0.4   AEOS 0.1       Recent Labs   Lab Test 11/21/18  1539   IGG 1,680*  1,620   IGM 90   IGE 16           ECOG PS: 0   ASSESSMENT AND RECOMMENDATIONS     Mrs. Escobar is a 54yo woman who is referred for work up of leukopenia. She has a history of frequent upper respiratory tract infections (URIs). Lab review from 11/21/2018  with mild neutropenia which appears new since 2015, albeit she had leukopenia in 2015. 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 Less common causes include viral infections, nutritional deficiencies, rheumatologic disorders, and also hematologic conditions. 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

## 2018-12-18 ENCOUNTER — ONCOLOGY VISIT (OUTPATIENT)
Dept: ONCOLOGY | Facility: CLINIC | Age: 53
End: 2018-12-18
Attending: INTERNAL MEDICINE
Payer: COMMERCIAL

## 2018-12-18 ENCOUNTER — PRE VISIT (OUTPATIENT)
Dept: ONCOLOGY | Facility: CLINIC | Age: 53
End: 2018-12-18

## 2018-12-18 VITALS
SYSTOLIC BLOOD PRESSURE: 127 MMHG | HEIGHT: 63 IN | DIASTOLIC BLOOD PRESSURE: 89 MMHG | HEART RATE: 88 BPM | BODY MASS INDEX: 34.2 KG/M2 | TEMPERATURE: 99.1 F | OXYGEN SATURATION: 99 % | WEIGHT: 193 LBS | RESPIRATION RATE: 16 BRPM

## 2018-12-18 DIAGNOSIS — D70.8 OTHER NEUTROPENIA (H): Primary | ICD-10-CM

## 2018-12-18 LAB
ALBUMIN SERPL-MCNC: 3.2 G/DL (ref 3.4–5)
ALP SERPL-CCNC: 119 U/L (ref 40–150)
ALT SERPL W P-5'-P-CCNC: 26 U/L (ref 0–50)
ANION GAP SERPL CALCULATED.3IONS-SCNC: 6 MMOL/L (ref 3–14)
AST SERPL W P-5'-P-CCNC: 17 U/L (ref 0–45)
BASOPHILS # BLD AUTO: 0 10E9/L (ref 0–0.2)
BASOPHILS NFR BLD AUTO: 0.5 %
BILIRUB SERPL-MCNC: 0.4 MG/DL (ref 0.2–1.3)
BUN SERPL-MCNC: 12 MG/DL (ref 7–30)
CALCIUM SERPL-MCNC: 8.8 MG/DL (ref 8.5–10.1)
CHLORIDE SERPL-SCNC: 104 MMOL/L (ref 94–109)
CO2 SERPL-SCNC: 28 MMOL/L (ref 20–32)
CREAT SERPL-MCNC: 0.7 MG/DL (ref 0.52–1.04)
DIFFERENTIAL METHOD BLD: ABNORMAL
EOSINOPHIL # BLD AUTO: 0.1 10E9/L (ref 0–0.7)
EOSINOPHIL NFR BLD AUTO: 1.5 %
ERYTHROCYTE [DISTWIDTH] IN BLOOD BY AUTOMATED COUNT: 13.8 % (ref 10–15)
FOLATE SERPL-MCNC: 17.8 NG/ML
GFR SERPL CREATININE-BSD FRML MDRD: 86 ML/MIN/1.7M2
GLUCOSE SERPL-MCNC: 85 MG/DL (ref 70–99)
HCT VFR BLD AUTO: 38.4 % (ref 35–47)
HGB BLD-MCNC: 12.1 G/DL (ref 11.7–15.7)
IMM GRANULOCYTES # BLD: 0 10E9/L (ref 0–0.4)
IMM GRANULOCYTES NFR BLD: 0 %
LYMPHOCYTES # BLD AUTO: 2.3 10E9/L (ref 0.8–5.3)
LYMPHOCYTES NFR BLD AUTO: 57.9 %
MCH RBC QN AUTO: 26.6 PG (ref 26.5–33)
MCHC RBC AUTO-ENTMCNC: 31.5 G/DL (ref 31.5–36.5)
MCV RBC AUTO: 84 FL (ref 78–100)
MONOCYTES # BLD AUTO: 0.3 10E9/L (ref 0–1.3)
MONOCYTES NFR BLD AUTO: 8.6 %
NEUTROPHILS # BLD AUTO: 1.3 10E9/L (ref 1.6–8.3)
NEUTROPHILS NFR BLD AUTO: 31.5 %
NRBC # BLD AUTO: 0 10*3/UL
NRBC BLD AUTO-RTO: 0 /100
PLATELET # BLD AUTO: 229 10E9/L (ref 150–450)
POTASSIUM SERPL-SCNC: 3.9 MMOL/L (ref 3.4–5.3)
PROT SERPL-MCNC: 7.8 G/DL (ref 6.8–8.8)
RBC # BLD AUTO: 4.55 10E12/L (ref 3.8–5.2)
SODIUM SERPL-SCNC: 138 MMOL/L (ref 133–144)
VIT B12 SERPL-MCNC: 804 PG/ML (ref 193–986)
WBC # BLD AUTO: 4 10E9/L (ref 4–11)

## 2018-12-18 PROCEDURE — G0463 HOSPITAL OUTPT CLINIC VISIT: HCPCS | Mod: ZF

## 2018-12-18 PROCEDURE — 84165 PROTEIN E-PHORESIS SERUM: CPT | Performed by: INTERNAL MEDICINE

## 2018-12-18 PROCEDURE — 86704 HEP B CORE ANTIBODY TOTAL: CPT | Performed by: INTERNAL MEDICINE

## 2018-12-18 PROCEDURE — 85025 COMPLETE CBC W/AUTO DIFF WBC: CPT | Performed by: INTERNAL MEDICINE

## 2018-12-18 PROCEDURE — 86706 HEP B SURFACE ANTIBODY: CPT | Performed by: INTERNAL MEDICINE

## 2018-12-18 PROCEDURE — 82525 ASSAY OF COPPER: CPT | Performed by: INTERNAL MEDICINE

## 2018-12-18 PROCEDURE — 40000611 ZZHCL STATISTIC MORPHOLOGY W/INTERP HEMEPATH TC 85060: Performed by: INTERNAL MEDICINE

## 2018-12-18 PROCEDURE — 87340 HEPATITIS B SURFACE AG IA: CPT | Performed by: INTERNAL MEDICINE

## 2018-12-18 PROCEDURE — 87389 HIV-1 AG W/HIV-1&-2 AB AG IA: CPT | Performed by: INTERNAL MEDICINE

## 2018-12-18 PROCEDURE — 82607 VITAMIN B-12: CPT | Performed by: INTERNAL MEDICINE

## 2018-12-18 PROCEDURE — 80053 COMPREHEN METABOLIC PANEL: CPT | Performed by: INTERNAL MEDICINE

## 2018-12-18 PROCEDURE — 82746 ASSAY OF FOLIC ACID SERUM: CPT | Performed by: INTERNAL MEDICINE

## 2018-12-18 PROCEDURE — 86803 HEPATITIS C AB TEST: CPT | Performed by: INTERNAL MEDICINE

## 2018-12-18 PROCEDURE — 99205 OFFICE O/P NEW HI 60 MIN: CPT | Mod: ZP | Performed by: INTERNAL MEDICINE

## 2018-12-18 PROCEDURE — 00000402 ZZHCL STATISTIC TOTAL PROTEIN: Performed by: INTERNAL MEDICINE

## 2018-12-18 ASSESSMENT — PAIN SCALES - GENERAL: PAINLEVEL: NO PAIN (0)

## 2018-12-18 ASSESSMENT — MIFFLIN-ST. JEOR: SCORE: 1439.57

## 2018-12-18 NOTE — LETTER
12/18/2018       RE: Sruthi Escobar  [address] [facility] [address]     Dear Colleague,    Thank you for referring your patient, Sruthi Escobar, to the [facility]. Please see a copy of my visit note below.    [facility] PHYSICIANS  HEMATOLOGY AND MEDICAL ONCOLOGY    CONSULTATION    PATIENT NAME: Sruthi Escobar   MRN# [id number]     Date of Visit: Dec 18, 2018    Referring Provider: Louis Barragan MD  [address] [place] [id number] YOB: 1965     Reason for consult: leukopenia    CHIEF COMPLAINT   Frequent infections     HISTORY OF PRESENTING ILLNESS     Mrs. Escobar is a 52yo woman who is referred for work up of leukopenia. 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 She states that she gets frequent upper respiratory tract infections (URIs) when she encounters patients with URIs. She was recently seen by the pulmonary service who requested a hematology consult. Per pulmonary note she has chronic anemia and leukopenia. Lab review from 11/21/2018 with WBC 4.2x10^3/uL, Hemoglobin 12.3g/dL, platelet 232 x10^3/uL, MCV 85fL, absolute neutrophils 1.4x10^3/uL, normal ESR 28mm/hr, normal ESR <2.9mg/L and normal antibody levels. Lab review from 2015 with WBC 3.6, ANC 2.5x10^3/uL and ALC 0.9x10^3/uL. CT chest on 11/21/2018 with mild central bronchiectasis, few scattered pulmonary nodules and a right middle lobe cyst.     12/18/2018: She presents to clinic for first appointment. She feels well. She takes augmentin for a sinus infection which manifested with nasal congestion, fever and fatigue. She denies any cervical or axillary lymphadenopathy. She reports that she is getting frequent URI almost every 1-3 moths since 2016. She reports chronic anemia for the last 20 years because of low iron. She reports that the anemia improved with oral iron ferrous sulfate 325mg BID. She used to have heavy periods. She is  currently in menopause  She denies any prior admissions to the hospital for IV antibiotics. She is originally from [place]. Her son was recently diagnosed with beta thalassemia trait       PAST MEDICAL HISTORY     Past Medical History:   Diagnosis Date     Bronchiectasis (H)      Gastroesophageal reflux disease      Hypertension      Pain in shoulder region, left         PAST SURGICAL HISTORY     Partial thyroidectomy  2-c sections        CURRENT OUTPATIENT MEDICATIONS     Current Outpatient Medications   Medication Sig     albuterol (PROAIR HFA/PROVENTIL HFA/VENTOLIN HFA) 108 (90 Base) MCG/ACT Inhaler Inhale 2 puffs into the lungs every 6 hours (Patient not taking: Reported on 11/21/2018)     calcium-vitamin D (CALTRATE) 600-400 MG-UNIT per tablet Take 1 tablet by mouth daily     ferrous sulfate (CVS IRON) 325 (65 Fe) MG tablet Take 325 mg by mouth daily     hydrochlorothiazide (MICROZIDE) 12.5 MG capsule Take 1 capsule (12.5 mg) by mouth daily     lisinopril (PRINIVIL/ZESTRIL) 10 MG tablet Take 1 tablet (10 mg) by mouth daily     Multiple Vitamin (MULTIVITAMIN  S) CAPS Take 1 capsule by mouth daily     No current facility-administered medications for this visit.         ALLERGIES     Allergies   Allergen Reactions     Prochlorperazine Anxiety and Itching     Phenothiazines Rash     Sulfa Drugs Rash     .     SOCIAL HISTORY     Social History     Socioeconomic History     Marital status:      Spouse name: Not on file     Number of children: Not on file     Years of education: Not on file     Highest education level: Not on file   Social Needs     Financial resource strain: Not on file     Food insecurity - worry: Not on file     Food insecurity - inability: Not on file     Transportation needs - medical: Not on file     Transportation needs - non-medical: Not on file   Occupational History     Not on file   Tobacco Use     Smoking status: Never Smoker     Smokeless tobacco: Never Used   Substance and Sexual  "Activity     Alcohol use: No     Drug use: No     Sexual activity: Yes     Partners: Male   Other Topics Concern     Not on file   Social History Narrative     Not on file          FAMILY HISTORY     Family History   Problem Relation Age of Onset     Hypertension Mother      Uterine Cancer Mother      Heart Disease Father      Hypertension Maternal Grandmother      Heart Disease Maternal Grandfather           REVIEW OF SYSTEMS   Pertinent positives have been included in HPI;  Review Of Systems  General: as per hpi  Skin: negative for rash  Eyes: negative for visual blurring  Ears/Nose/Throat: negative for hearing loss  Respiratory: No shortness of breath, dyspnea on exertion, cough, or hemoptysis  Cardiovascular: negative for palpitations and tachycardia  Gastrointestinal: negative for nausea, vomiting and abdominal pain  Genitourinary: negative for nocturia and dysuria  Musculoskeletal: negative for muscular pain or bone pain  Neurologic: negative for migraine headaches  Psychiatric: negative for anxiety  Hematologic/Lymphatic/Immunologic: as per hpi  Endocrine: hx of goiter-no current symptoms of hypothyroidism       PHYSICAL EXAM   /89   Pulse 88   Temp 99.1  F (37.3  C) (Oral)   Resp 16   Ht 1.6 m (5' 3\")   Wt 87.5 kg (193 lb)   SpO2 99%   BMI 34.19 kg/m     General appearance: pleasant woman, not in acute distress  Eyes, Ears, Nose, Throat & Mouth:pupils equal and reactive to light and accommodation, extraocular movements intact, no icterus, injection or pallor. Oropharynx is clear.  Neck: supple, see hem  Respiratory: clear to auscultation bilaterally  Cardiovascular: regular, no murmurs, rubs, or gallops  Gastrointenstinal: soft, non-tender, non-distended, normal bowel sounds, no hepatosplenomegaly  Extremities: warm, well perfused, no edema  Neurologic: Alert and oriented to person, place and time, Cranial nerves 2-12 intact, intact sensation to light touch, muscle strength 5/5 in 4 extremities, " reflexes +2   Skin: no rash  Hematologic/Lymph: no cervical, axillary or inguinal lymphadenopathy     LABORATORY AND IMAGING STUDIES       Recent Labs   Lab Test 11/21/18  1539   WBC 4.2   RBC 4.62   HGB 12.3   HCT 39.2   MCV 85   MCH 26.6   MCHC 31.4*   RDW 14.3      NEUTROPHIL 34.0   ANEU 1.4*   ALYM 2.2   CHUYITA 0.4   AEOS 0.1       Recent Labs   Lab Test 11/21/18  1539   IGG 1,680*  1,620   IGM 90   IGE 16           ECOG PS: 0   ASSESSMENT AND RECOMMENDATIONS     Mrs. Escobar is a 52yo woman who is referred for work up of leukopenia. She has a history of frequent upper respiratory tract infections (URIs). Lab review from 11/21/2018  with mild neutropenia which appears new since 2015, albeit she had leukopenia in 2015. 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 Less common causes include viral infections, nutritional deficiencies, rheumatologic disorders, and also hematologic conditions. 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

## 2018-12-18 NOTE — NURSING NOTE
"Oncology Rooming Note    December 18, 2018 3:13 PM   Sruthi Escobar is a 53 year old female who presents for:    Chief Complaint   Patient presents with     Oncology Clinic Visit     New patient; Neutropenia     Initial Vitals: /89   Pulse 88   Temp 99.1  F (37.3  C) (Oral)   Resp 16   Ht 1.6 m (5' 3\")   Wt 87.5 kg (193 lb)   SpO2 99%   BMI 34.19 kg/m   Estimated body mass index is 34.19 kg/m  as calculated from the following:    Height as of this encounter: 1.6 m (5' 3\").    Weight as of this encounter: 87.5 kg (193 lb). Body surface area is 1.97 meters squared.  No Pain (0) Comment: Data Unavailable   No LMP recorded. Patient is postmenopausal.  Allergies reviewed: Yes  Medications reviewed: Yes    Medications: Medication refills not needed today.  Pharmacy name entered into [facility]: [facility] [address] [place] [address]  AT [facility] [address]    Clinical concerns: Neutropenia     6 minutes for nursing intake (face to face time)     Tamia Angeles CMA              "

## 2018-12-19 LAB
ALBUMIN SERPL ELPH-MCNC: 3.8 G/DL (ref 3.7–5.1)
ALPHA1 GLOB SERPL ELPH-MCNC: 0.3 G/DL (ref 0.2–0.4)
ALPHA2 GLOB SERPL ELPH-MCNC: 0.8 G/DL (ref 0.5–0.9)
B-GLOBULIN SERPL ELPH-MCNC: 0.9 G/DL (ref 0.6–1)
COPATH REPORT: NORMAL
GAMMA GLOB SERPL ELPH-MCNC: 1.6 G/DL (ref 0.7–1.6)
HBV CORE AB SERPL QL IA: REACTIVE
HBV SURFACE AB SERPL IA-ACNC: 35.02 M[IU]/ML
HBV SURFACE AG SERPL QL IA: NONREACTIVE
HCV AB SERPL QL IA: NONREACTIVE
HIV 1+2 AB+HIV1 P24 AG SERPL QL IA: NONREACTIVE
M PROTEIN SERPL ELPH-MCNC: 0 G/DL
PROT PATTERN SERPL ELPH-IMP: NORMAL

## 2018-12-21 LAB — COPPER SERPL-MCNC: 173 UG/DL (ref 80–155)

## 2019-11-04 ENCOUNTER — OFFICE VISIT (OUTPATIENT)
Dept: INTERNAL MEDICINE | Facility: CLINIC | Age: 54
End: 2019-11-04
Payer: COMMERCIAL

## 2019-11-04 VITALS
OXYGEN SATURATION: 98 % | TEMPERATURE: 99.2 F | BODY MASS INDEX: 34.35 KG/M2 | SYSTOLIC BLOOD PRESSURE: 128 MMHG | HEART RATE: 94 BPM | DIASTOLIC BLOOD PRESSURE: 86 MMHG | WEIGHT: 193.9 LBS

## 2019-11-04 DIAGNOSIS — R22.1 MASS OF NECK: Primary | ICD-10-CM

## 2019-11-04 ASSESSMENT — PAIN SCALES - GENERAL: PAINLEVEL: NO PAIN (0)

## 2019-11-04 NOTE — NURSING NOTE
Chief Complaint   Patient presents with     Mass     pt states she has a nodule on the left side of neck       Vandana Jo CMA at 8:27 AM on 11/4/2019.

## 2019-11-04 NOTE — PATIENT INSTRUCTIONS
[facility] Medication Refill Request Information:  * Please contact your pharmacy regarding ANY request for medication refills.  ** [facility] Fax = [phone]  * Please allow 3 business days for routine medication refills.  * Please allow 5 business days for controlled substance medication refills.     [facility] Test Result notification information:  *You will be notified with in 7-10 days of your appointment day regarding the results of your test.  If you are on MyChart you will be notified as soon as the provider has reviewed the results and signed off on them.    [facility] [phone]

## 2019-11-04 NOTE — PROGRESS NOTES
PRIMARY CARE CENTER         HPI:       Sruthi Escobar is a 53 YO F from [facility], with a h/o partial thyroidectomy, benign pulmonary nodules, frequent URI's requiring Abx treatment,  benign ethnic neutropenia, and family history of beta thallasemia who presents for the evaluation of a mass on the left side of her jawline.    Ms. Escobar first noticed the mass 9 months ago. Its size has been variable but always palpable and enlarged. She notes that it gets bigger whenever she gets sick. 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 She believes that the mass is an enlarged lymph node. Denies swelling of any other lymph nodes of her head/neck. The mass is currently at its enlarged baseline state. It has never been warm, erythematous, or associated with superficial dermatological changes. Denies trauma to neck or jaw. Denies sore throat, recent dental procedures, oral infection, odynophagia, dysphagia, or pain with chewing. She denies a history of fevers, chills, or weight loss. She endorses occasional night sweats, but believes this is related to menopause. Patient is a non-smoker and does not consume EtOH.    Problem, Medication and Allergy Lists were   reviewed and are current.   There are no active problems to display for this patient.    Current Outpatient Medications   Medication Sig Dispense Refill     albuterol (PROAIR HFA/PROVENTIL HFA/VENTOLIN HFA) 108 (90 Base) MCG/ACT Inhaler Inhale 2 puffs into the lungs every 6 hours 1 Inhaler 3     calcium-vitamin D (CALTRATE) 600-400 MG-UNIT per tablet Take 1 tablet by mouth daily       ferrous sulfate (CVS IRON) 325 (65 Fe) MG tablet Take 325 mg by mouth daily       hydrochlorothiazide (MICROZIDE) 12.5 MG capsule Take 1 capsule (12.5 mg) by mouth daily 90 capsule 3     lisinopril (PRINIVIL/ZESTRIL) 10 MG tablet Take 1 tablet (10 mg) by mouth daily 90 tablet 3     Multiple Vitamin (MULTIVITAMIN  S) CAPS Take 1 capsule by  mouth daily       Allergies   Allergen Reactions     Prochlorperazine Anxiety and Itching     Phenothiazines Rash     Patient is an established patient of this clinic.         Review of Systems:     ROS  I have personally reviewed and updated the complete ROS on the day of the visit.           Physical Exam:   /86 (BP Location: Right arm, Patient Position: Sitting, Cuff Size: Adult Large)   Pulse 94   Temp 99.2  F (37.3  C) (Oral)   Wt 88 kg (193 lb 14.4 oz)   SpO2 98%   BMI 34.35 kg/m    Body mass index is 34.35 kg/m .  Vitals were reviewed     GEN: pleasant  woman, in NAD  CELESTINE: NC, AT  NECK/Throat: neck supple, no thryromegaly, non-erythematous oropharynx, no palpable preauricular, posterior auricular, occipital, parotid, submental, superficial cervical, deep cervical, posterior cervical, supraclavicular, or axillary LAD. No R sided submandibular LAD. Mass palpable on L side infero-lateral to submandibular lymph node region; mass non-tender to palpation, no warmth, no erythema, no break in skin or drainage.  CARDS: RRR, no m/r/g  PULM: CTAB  ABD: soft, nttp, +BS, no hepatosplenomegaly  EXT: non-edematous, moving all spontaneously  NEURO: CN 2-12 grossly intact, AOx4      Results:     Heme-onc results reviewed from 12/2018 including protein electrophoresis, B9, B12, CBC, Hep B panel, Hep C, HIV, CMP, Cu, and blood smear.     11/21/18 CT Chest W/out Contrast:  1. A few scattered pulmonary nodules as described in the body of the  report are stable since at least 2016 and therefore statistically  benign.  2. Mild central bronchiectasis.  3. Small sliding-type hiatal hernia.     Assessment and Plan     Sruthi Escobar is a 53 YO F [facility], with a h/o partial thyroidectomy, benign pulmonary nodules, frequent URI's requiring Abx treatment,  benign ethnic neutropenia, and family history of beta thallasemia who presents for the evaluation of a mass on the left side of her jawline. Patient denied  any red flag symptoms such as fevers, chills, weight loss, dysphagia, odynophagia, or SOB. Unlikely neoplastic etiology of mass given lack of B symptoms and fluctuating size of mass associated with acute illnesses. Most likely inflammatory or congenital etiology. Further imaging will be done to narrow differential. Patient in agreement with plan.    # Neck Mass  - CT neck with contrast scheduled for 11/13/19.    Options for treatment and follow-up care were reviewed with the patient. Sruthi Cardozorola engaged in the decision making process and verbalized understanding of the options discussed and agreed with the final plan.    Isaias Dennis Jr., MD  Nov 4, 2019    Pt was seen and plan of care discussed with Hola Alexander MD.     Ms Escobar was seen and examined with the resident Dr Dennis , I have reviewed his note and plan I agree with imaging as next step to help reassure this patient  Hola Alexander MD

## 2019-11-06 ENCOUNTER — ANCILLARY PROCEDURE (OUTPATIENT)
Dept: CT IMAGING | Facility: CLINIC | Age: 54
End: 2019-11-06
Attending: INTERNAL MEDICINE
Payer: COMMERCIAL

## 2019-11-06 DIAGNOSIS — R22.1 MASS OF NECK: ICD-10-CM

## 2019-11-06 RX ORDER — IOPAMIDOL 755 MG/ML
100 INJECTION, SOLUTION INTRAVASCULAR ONCE
Status: COMPLETED | OUTPATIENT
Start: 2019-11-06 | End: 2019-11-06

## 2019-11-06 RX ADMIN — IOPAMIDOL 100 ML: 755 INJECTION, SOLUTION INTRAVASCULAR at 15:54

## 2019-11-07 ENCOUNTER — TELEPHONE (OUTPATIENT)
Dept: INTERNAL MEDICINE | Facility: CLINIC | Age: 54
End: 2019-11-07

## 2019-11-07 NOTE — TELEPHONE ENCOUNTER
[facility] Call Center    Phone Message    May a detailed message be left on voicemail: no    Reason for Call: Other: 2nd call from Pt today to get her result for her CT Neck she completed. Pt is anxious and wants to be called asap.     Action Taken: Message routed to:  [facility] ([facility]): [facility]

## 2019-11-07 NOTE — TELEPHONE ENCOUNTER
Spoke to patient to relay that currently only the preliminary results are in and we do not have the final results and that final results have not been sent to Dr. Alexander to review and result on. Relayed that once we get the final results we will call to relay results. Patient is very anxious and would like to know what the results are and would like to not have to wait 7-10 days to find out results. Rosita Moses LPN 11/7/2019 3:03 PM

## 2019-11-18 ENCOUNTER — OFFICE VISIT (OUTPATIENT)
Dept: INTERNAL MEDICINE | Facility: CLINIC | Age: 54
End: 2019-11-18
Payer: COMMERCIAL

## 2019-11-18 ENCOUNTER — TELEPHONE (OUTPATIENT)
Dept: OTOLARYNGOLOGY | Facility: CLINIC | Age: 54
End: 2019-11-18

## 2019-11-18 VITALS
DIASTOLIC BLOOD PRESSURE: 94 MMHG | WEIGHT: 195 LBS | HEART RATE: 90 BPM | BODY MASS INDEX: 34.54 KG/M2 | OXYGEN SATURATION: 99 % | SYSTOLIC BLOOD PRESSURE: 147 MMHG

## 2019-11-18 DIAGNOSIS — R71.8 RBC MICROCYTOSIS: Primary | ICD-10-CM

## 2019-11-18 DIAGNOSIS — R59.1 LYMPHADENOPATHY: ICD-10-CM

## 2019-11-18 DIAGNOSIS — R71.8 RBC MICROCYTOSIS: ICD-10-CM

## 2019-11-18 LAB
BASOPHILS # BLD AUTO: 0 10E9/L (ref 0–0.2)
BASOPHILS NFR BLD AUTO: 0.5 %
DIFFERENTIAL METHOD BLD: ABNORMAL
EOSINOPHIL # BLD AUTO: 0.1 10E9/L (ref 0–0.7)
EOSINOPHIL NFR BLD AUTO: 1.3 %
ERYTHROCYTE [DISTWIDTH] IN BLOOD BY AUTOMATED COUNT: 13.8 % (ref 10–15)
FERRITIN SERPL-MCNC: 53 NG/ML (ref 8–252)
HCT VFR BLD AUTO: 39 % (ref 35–47)
HGB BLD-MCNC: 12 G/DL (ref 11.7–15.7)
IMM GRANULOCYTES # BLD: 0 10E9/L (ref 0–0.4)
IMM GRANULOCYTES NFR BLD: 0.3 %
LYMPHOCYTES # BLD AUTO: 1.9 10E9/L (ref 0.8–5.3)
LYMPHOCYTES NFR BLD AUTO: 46.6 %
MCH RBC QN AUTO: 26.4 PG (ref 26.5–33)
MCHC RBC AUTO-ENTMCNC: 30.8 G/DL (ref 31.5–36.5)
MCV RBC AUTO: 86 FL (ref 78–100)
MONOCYTES # BLD AUTO: 0.4 10E9/L (ref 0–1.3)
MONOCYTES NFR BLD AUTO: 11.1 %
NEUTROPHILS # BLD AUTO: 1.6 10E9/L (ref 1.6–8.3)
NEUTROPHILS NFR BLD AUTO: 40.2 %
NRBC # BLD AUTO: 0 10*3/UL
NRBC BLD AUTO-RTO: 0 /100
PLATELET # BLD AUTO: 232 10E9/L (ref 150–450)
RBC # BLD AUTO: 4.54 10E12/L (ref 3.8–5.2)
WBC # BLD AUTO: 4 10E9/L (ref 4–11)

## 2019-11-18 ASSESSMENT — PAIN SCALES - GENERAL: PAINLEVEL: NO PAIN (0)

## 2019-11-18 NOTE — NURSING NOTE
Chief Complaint   Patient presents with     Results     pt here to discuss lab results and to get a referral to ENT     Kimberly Nissen, EMT at 8:10 AM on 11/18/2019

## 2019-11-18 NOTE — TELEPHONE ENCOUNTER
Attempted to contact patient to set up an appointment for Lymphadenopathy // Ref by Dr. Alexander with one of the head and neck specialists as directed by the ENT RNs. Patient did not answer call and voice mail was full. Sent patient a [employer] message informing her of the added 12/3 appt and left call center number if that wouldn't work. Will also send itinerary.

## 2019-11-19 NOTE — TELEPHONE ENCOUNTER
FUTURE VISIT INFORMATION      FUTURE VISIT INFORMATION:    Date: 12/3/19    Time: 8:30AM    Location: [facility]  REFERRAL INFORMATION:    Referring provider:  Hola Alexander MD    Referring providers clinic:  [facility]    Reason for visit/diagnosis  Lymphadenopathy     RECORDS REQUESTED FROM:       Clinic name Comments Records Status Imaging Status   [facility] 11/18/19 referral and notes from Dr Alexander EPIC    [facility] 11/6/19 CT Neck Epic PACS   [facility] 6/9/17 CT Head  Care Everywhere  req 11/19 - PACS                       11/19/19 10:58AM sent a fax to [facility] for images - amay   * 11/29/19 12:33 PM Faxed 2nd request to [facility] for image to be pushed - [place]  12/2/19 11:16AM Images in PACS - [facility]

## 2019-11-19 NOTE — PROGRESS NOTES
"                     PRIMARY CARE CENTER       SUBJECTIVE:  Sruthi Escobar is a 54 year old female with a PMHx of   Past Medical History:   Diagnosis Date     Bronchiectasis (H)      Gastroesophageal reflux disease      Hypertension      Pain in shoulder region, left     who comes in for  Evaluation of tender lymph node on L submandibular area;\"i may be getting tonsillitis again\". She has documented cervical lymphadenopathy  And a CT last week that were felt to be WNL last week. To day she feel fatigued and the L sub mandibular node is tender.  No objective fever,cough headache.  Medications and allergies reviewed by me today.     ROS:   Pulmonary cardiac G-I,G-U ros neg today    OBJECTIVE:    BP (!) 147/94   Pulse 90   Wt 88.5 kg (195 lb)   SpO2 99%   BMI 34.54 kg/m     Wt Readings from Last 1 Encounters:   11/18/19 88.5 kg (195 lb)     Pleasant cheerful woman rubbing her L submandibular area frequently   somewhat anxious in mood     HEENT revealed as before shoddy adenopathy jamal ant cervical triangle and a larger tender l sub mandibular node that was soft and not fixed pharynx was benign no tonsillar swelling   Lungs clear to P&A  Heart regular no murmur  There was no other discernible adenopathy axillary  Epitrochlear  Posterior cervical  ASSESSMENT/PLAN:    Sruthi was seen today for results.    Diagnoses and all orders for this visit:    RBC microcytosis  -     Ferritin; Future  -     CBC with platelets differential; Future    Lymphadenopathy  -     OTOLARYNGOLOGY REFERRAL     She has been diagnosed as benign neutropenia and possible b thalassemia her iron stores were adequate and CBS revealed persistent low neutrophils. BP was elevated she had not taken BP meds this AM.  Request second opinion re nodes referral to Otolarynology per her request.      Pt should return to clinic for f/u with me prn }     Hola Alexander MD  Nov 18, 2019      "

## 2019-11-20 ENCOUNTER — DOCUMENTATION ONLY (OUTPATIENT)
Dept: CARE COORDINATION | Facility: CLINIC | Age: 54
End: 2019-11-20

## 2019-12-03 ENCOUNTER — OFFICE VISIT (OUTPATIENT)
Dept: OTOLARYNGOLOGY | Facility: CLINIC | Age: 54
End: 2019-12-03
Payer: COMMERCIAL

## 2019-12-03 ENCOUNTER — PRE VISIT (OUTPATIENT)
Dept: OTOLARYNGOLOGY | Facility: CLINIC | Age: 54
End: 2019-12-03

## 2019-12-03 VITALS
SYSTOLIC BLOOD PRESSURE: 155 MMHG | BODY MASS INDEX: 34.37 KG/M2 | DIASTOLIC BLOOD PRESSURE: 98 MMHG | WEIGHT: 194 LBS | HEART RATE: 81 BPM

## 2019-12-03 DIAGNOSIS — R22.1 NECK MASS: Primary | ICD-10-CM

## 2019-12-03 ASSESSMENT — PAIN SCALES - GENERAL: PAINLEVEL: NO PAIN (0)

## 2019-12-03 NOTE — PROGRESS NOTES
Otolaryngology Clinic      Name: Sruthi Escobar  MRN: [id number]  Age: 54 year old  : 1965  Referring provider: oHla Alexander  2019      Chief Complaint:  Consult     History of Present Illness:   Sruthi Escobar is a 54 year old female who presents for evaluation of lymphadenopathy along the left mandible. CT soft tissue neck from 2019 showed no significant abnormalities as outlined below. She notes that when she gets infections she experiences some left submandibular pain and swelling. She states that these episodes occur about 4-6 times a year. Also notes that the pain extends down her left sternocleidomastoid muscle but states that this has been a chronic pain of hers.      Active Medications:     Current Outpatient Medications:      albuterol (PROAIR HFA/PROVENTIL HFA/VENTOLIN HFA) 108 (90 Base) MCG/ACT Inhaler, Inhale 2 puffs into the lungs every 6 hours, Disp: 1 Inhaler, Rfl: 3     calcium-vitamin D (CALTRATE) 600-400 MG-UNIT per tablet, Take 1 tablet by mouth daily, Disp: , Rfl:      ferrous sulfate (CVS IRON) 325 (65 Fe) MG tablet, Take 325 mg by mouth daily, Disp: , Rfl:      hydrochlorothiazide (MICROZIDE) 12.5 MG capsule, Take 1 capsule (12.5 mg) by mouth daily, Disp: 90 capsule, Rfl: 3     lisinopril (PRINIVIL/ZESTRIL) 10 MG tablet, Take 1 tablet (10 mg) by mouth daily, Disp: 90 tablet, Rfl: 3     Multiple Vitamin (MULTIVITAMIN  S) CAPS, Take 1 capsule by mouth daily, Disp: , Rfl:       Allergies:   Prochlorperazine and Phenothiazines      Past Medical History:  Bronchiectasis  GERD  HTN     Past Surgical History:  No past surgical history    Family History:   No pertinent family history.      Social History:   Denies tobacco use.  Denies alcohol use.  Denies drug use.    Review of Systems:   Pertinent items are noted in HPI or as in patient entered ROS below, remainder of complete ROS is negative.     Physical Exam:   BP (!) 155/98 (BP Location: Left arm, Patient Position:  Chair, Cuff Size: Adult Regular)   Pulse 81   Wt 88 kg (194 lb)   BMI 34.37 kg/m       Constitutional:  The patient was unaccompanied, well-groomed, and in no acute distress.    Skin:  Warm and pink.    Neurologic:  Alert and oriented x 3.  CN's III-XII within normal limits.  Voice normal.   Psychiatric:  The patient's affect was calm, cooperative, and appropriate.    Respiratory:  Breathing comfortably without stridor or exertion of accessory muscles.    Eyes: Extraocular movement intact.    Head:  Normocephalic and atraumatic.  No lesions or scars.    Ears:  Pinnae and tragus non-tender.  EAC's and TM's were clear.     Nose:  Sinuses were non-tender.  Anterior rhinoscopy revealed midline septum and absence of purulence or polyps.    OC/OP:  Normal tongue, floor of mouth, buccal mucosa, and palate.  No lesions or masses on inspection or palpation.  No abnormal lymph tissue in the oropharynx.  The pterygoid region is non-tender.    Neck:  Supple with normal laryngeal and tracheal landmarks.  The parotid beds were without masses.  No palpable thyroid.  Lymphatic:  There is no palpable lymphadenopathy in the neck.     Imaging:  CT neck (11/06/2019):  1. No suspicious mass or adenopathy in the neck.  2. Few prominent left level 1b cervical lymph nodes likely correspond to the palpable abnormality along the left mandible. These are normal according to CT criteria.  3. Heterogeneous thyroid gland with few small thyroid nodules which require no further follow up in this age group.     Assessment and Plan:  54 year old female with a history that sounds like submandibular swelling. No evidence of obstruction or other abnormalities on CT scan. I advised the patient to utilize preventative therapies like staying hydrated and inducing salivation with hard candies/Xylitol. Return in 4-5 months if she is still symptomatic for possible sialoendoscopy. Return sooner for any significant worsening of symptoms.      Scribe  Disclosure:  I, Tramaine Angulo, am serving as a scribe to document services personally performed by Mark Kate MD at this visit, based upon the provider's statements to me. All documentation has been reviewed by the aforementioned provider prior to being entered into the official medical record.

## 2019-12-03 NOTE — LETTER
12/3/2019       RE: Sruthi Escobar  [address] [address]     Dear Colleague,    Thank you for referring your patient, Sruthi Escobar, to the [facility] EAR NOSE AND THROAT at [facility]. Please see a copy of my visit note below.      Otolaryngology Clinic      Name: Sruthi Escobar  MRN: [id number]  Age: 54 year old  : 1965  Referring provider: Hola Alexander  2019      Chief Complaint:  Consult     History of Present Illness:   Sruthi Escobar is a 54 year old female who presents for evaluation of lymphadenopathy along the left mandible. CT soft tissue neck from 2019 showed no significant abnormalities as outlined below. She notes that when she gets infections she experiences some left submandibular pain and swelling. She states that these episodes occur about 4-6 times a year. Also notes that the pain extends down her left sternocleidomastoid muscle but states that this has been a chronic pain of hers.      Active Medications:     Current Outpatient Medications:      albuterol (PROAIR HFA/PROVENTIL HFA/VENTOLIN HFA) 108 (90 Base) MCG/ACT Inhaler, Inhale 2 puffs into the lungs every 6 hours, Disp: 1 Inhaler, Rfl: 3     calcium-vitamin D (CALTRATE) 600-400 MG-UNIT per tablet, Take 1 tablet by mouth daily, Disp: , Rfl:      ferrous sulfate ([employer] IRON) 325 (65 Fe) MG tablet, Take 325 mg by mouth daily, Disp: , Rfl:      hydrochlorothiazide (MICROZIDE) 12.5 MG capsule, Take 1 capsule (12.5 mg) by mouth daily, Disp: 90 capsule, Rfl: 3     lisinopril (PRINIVIL/ZESTRIL) 10 MG tablet, Take 1 tablet (10 mg) by mouth daily, Disp: 90 tablet, Rfl: 3     Multiple Vitamin (MULTIVITAMIN  S) CAPS, Take 1 capsule by mouth daily, Disp: , Rfl:       Allergies:   Prochlorperazine and Phenothiazines      Past Medical History:  Bronchiectasis  GERD  HTN     Past Surgical History:  No past surgical history    Family History:   No pertinent family history.       Social History:   Denies tobacco use.  Denies alcohol use.  Denies drug use.    Review of Systems:   Pertinent items are noted in HPI or as in patient entered ROS below, remainder of complete ROS is negative.     Physical Exam:   BP (!) 155/98 (BP Location: Left arm, Patient Position: Chair, Cuff Size: Adult Regular)   Pulse 81   Wt 88 kg (194 lb)   BMI 34.37 kg/m        Constitutional:  The patient was unaccompanied, well-groomed, and in no acute distress.    Skin:  Warm and pink.    Neurologic:  Alert and oriented x 3.  CN's III-XII within normal limits.  Voice normal.   Psychiatric:  The patient's affect was calm, cooperative, and appropriate.    Respiratory:  Breathing comfortably without stridor or exertion of accessory muscles.    Eyes: Extraocular movement intact.    Head:  Normocephalic and atraumatic.  No lesions or scars.    Ears:  Pinnae and tragus non-tender.  EAC's and TM's were clear.     Nose:  Sinuses were non-tender.  Anterior rhinoscopy revealed midline septum and absence of purulence or polyps.    OC/OP:  Normal tongue, floor of mouth, buccal mucosa, and palate.  No lesions or masses on inspection or palpation.  No abnormal lymph tissue in the oropharynx.  The pterygoid region is non-tender.    Neck:  Supple with normal laryngeal and tracheal landmarks.  The parotid beds were without masses.  No palpable thyroid.  Lymphatic:  There is no palpable lymphadenopathy in the neck.     Imaging:  CT neck (11/06/2019):  1. No suspicious mass or adenopathy in the neck.  2. Few prominent left level 1b cervical lymph nodes likely correspond to the palpable abnormality along the left mandible. These are normal according to CT criteria.  3. Heterogeneous thyroid gland with few small thyroid nodules which require no further follow up in this age group.     Assessment and Plan:  54 year old female with a history that sounds like submandibular swelling. No evidence of obstruction or other abnormalities on  CT scan. I advised the patient to utilize preventative therapies like staying hydrated and inducing salivation with hard candies/Xylitol. Return in 4-5 months if she is still symptomatic for possible sialoendoscopy. Return sooner for any significant worsening of symptoms.      Scribe Disclosure:  I, Tramaine Agnulo, am serving as a scribe to document services personally performed by Mark Kate MD at this visit, based upon the provider's statements to me. All documentation has been reviewed by the aforementioned provider prior to being entered into the official medical record.    Again, thank you for allowing me to participate in the care of your patient.      Sincerely,    Mark Kate MD

## 2019-12-03 NOTE — NURSING NOTE
Chief Complaint   Patient presents with     Consult     Lymphadenopathy     Blood pressure (!) 155/98, pulse 81, weight 88 kg (194 lb).    Mya Alarcon, EMT

## 2019-12-03 NOTE — PATIENT INSTRUCTIONS
1. You were seen in the ENT Clinic today by Dr. Kate  If you have any questions or concerns after your appointment, please call   - Option 1: ENT Clinic: [phone]  - Option 2: Dominga MIRAMONTES Nurse Coordinator: [phone]    2. Plan: increase water intake and suck on hard sour candies daily to keep salivary gland open. Look for Zylotol in candy and gum.  Return to clinic 4 months or sooner if symptoms return.     Thank you for allowing us to be apart of your care!  Pricilla Nava LPN       Patient Education     Salivary Gland Swelling, Uncertain Cause  Salivary glands make saliva in response to food in your mouth. Saliva is mostly water. It also has minerals and proteins that help break down food and keep the mouth and teeth healthy. There are three pairs of salivary glands:    Parotid glands (in front of the ear)    Submandibular glands (below the jaw)    Sublingual glands (below the tongue)  Each gland has a duct (channel) that carries saliva from the gland into the mouth.   Swelling of the salivary glands can sometimes occur. Causes can include:    Viral infection (such as childhood mumps)    Bacterial infections    Sjögren's syndrome    Diabetes    Malnutrition    Sarcoidosis    Blockage of the salivary duct (from stones or tumors)  Certain medicines can affect salivary flow. This can lead to swelling of the gland. Be sure to tell your healthcare provider about all of the medicines you take.  Tests are being done to determine the cause of the swelling. These may include blood tests, X-ray, ultrasound, CT scan, or injection of dye into the duct to look for blockage. Treatment depends on the exact cause of the swelling.  Home care    If the area is painful, you can take over-the-counter medicines, such as acetaminophen or ibuprofen, unless you were prescribed another medicine. Wetting a cloth with warm water and putting it over the affected gland for 10-15 minutes at a time can also help ease pain.    To help  prevent blockages and infections:  ? Drink 6-8 glasses of fluid per day (such as water, tea, and clear soup) to keep well hydrated.  ? If you smoke, ask your healthcare provider for help to quit. Smoking makes salivary gland stones more likely.  ? Maintain good dental hygiene. Brush and floss your teeth daily. See your dentist for regular cleanings.  Follow-up care  Follow up with your healthcare provider or as advised. See your healthcare provider for further exams and testing. If you have been referred to a specialist, make an appointment promptly.  When to seek medical advice  Call your healthcare provider if any of the following occur:    Increasing pain or swelling in the gland    Inability to open mouth or pain when opening mouth    Fever of 100.4 F (38 C) or higher, or as directed by your healthcare provider    Redness over the gland    Pus draining into the mouth    Trouble breathing or swallowing    Any new symptoms  Prevention  Here are steps you can take to help prevent an infection:    Keep good hand washing habits.    Don t have close contact with people who have sore throats, colds, or other upper respiratory infections.    Don t smoke, and stay away from secondhand smoke.    Stay up to date with of your vaccines.  Date Last Reviewed: 11/1/2017 2000-2018 [employer]. [address] [place] [address], [place] [address]. All rights reserved. This information is not intended as a substitute for professional medical care. Always follow your healthcare professional's instructions.

## 2020-01-07 ENCOUNTER — OFFICE VISIT (OUTPATIENT)
Dept: OTOLARYNGOLOGY | Facility: CLINIC | Age: 55
End: 2020-01-07
Payer: COMMERCIAL

## 2020-01-07 VITALS
BODY MASS INDEX: 34.67 KG/M2 | WEIGHT: 195.7 LBS | SYSTOLIC BLOOD PRESSURE: 133 MMHG | HEART RATE: 82 BPM | DIASTOLIC BLOOD PRESSURE: 97 MMHG | OXYGEN SATURATION: 99 %

## 2020-01-07 DIAGNOSIS — R22.0 SUBMANDIBULAR SWELLING: Primary | ICD-10-CM

## 2020-01-07 DIAGNOSIS — R22.1 SUBMANDIBULAR SWELLING: Primary | ICD-10-CM

## 2020-01-07 RX ORDER — AMPICILLIN AND SULBACTAM 1; .5 G/1; G/1
1.5 INJECTION, POWDER, FOR SOLUTION INTRAMUSCULAR; INTRAVENOUS SEE ADMIN INSTRUCTIONS
Status: CANCELLED | OUTPATIENT
Start: 2020-01-07

## 2020-01-07 RX ORDER — AMPICILLIN AND SULBACTAM 2; 1 G/1; G/1
3 INJECTION, POWDER, FOR SOLUTION INTRAMUSCULAR; INTRAVENOUS
Status: CANCELLED | OUTPATIENT
Start: 2020-01-07

## 2020-01-07 ASSESSMENT — PAIN SCALES - GENERAL: PAINLEVEL: MODERATE PAIN (4)

## 2020-01-07 NOTE — NURSING NOTE
Chief Complaint   Patient presents with     Follow Up     left side glad swelling      Consult     discuss surgery to have tonsiles removed      BP (!) 133/97   Pulse 82   Wt 88.8 kg (195 lb 11.2 oz)   SpO2 99%   BMI 34.67 kg/m      Pricilla Nava LPN

## 2020-01-07 NOTE — LETTER
2020       RE: Sruthi Escobar  [address] [address]     Dear Colleague,    Thank you for referring your patient, Sruthi Escobar, to the [facility] EAR NOSE AND THROAT at [facility]. Please see a copy of my visit note below.      Otolaryngology Clinic      Name: Sruthi Escobar  MRN: [id number]  Age: 54 year old  : 2020      Chief Complaint:   Follow Up and Consult       History of Present Illness:   Sruthi Escobar is a 54 year old female who presents for follow up. The patient was previously evaluated on 12/3/19 for lymphadenopathy along her left mandible and there were no abnormalities on CT or concerning findings on exam. I recommended hydration and Xylitol as preventive measures at that time. Today she reports continued swelling of her lymph nodes and pain over her submandibular gland. She has also has frequent tonsillitis and wants her tonsils removed. She states she is on antibiotics between her tonsils and swelling every 2-3 months. She does get strep throat frequently.     Review of Systems:   Pertinent items are noted in HPI or as in patient entered ROS below, remainder of complete ROS is negative.    ENT ROS 12/3/2019   Constitutional Unexplained fatigue   Psychology Frequently feeling depressed or sad   Gastrointestinal/Genitourinary Heartburn/indigestion   Hematologic Lymph node swelling        Physical Exam:   BP (!) 133/97   Pulse 82   Wt 88.8 kg (195 lb 11.2 oz)   SpO2 99%   BMI 34.67 kg/m        PHYSICAL EXAMINATION:    Constitutional:  The patient was unaccompanied, well-groomed, and in no acute distress.    Skin:  Warm and pink.    Neurologic:  Alert and oriented x 3.  CN's III-XII within normal limits.  Voice normal.   Psychiatric:  The patient's affect was calm, cooperative, and appropriate.    Respiratory:  Breathing comfortably without stridor or exertion of accessory muscles.    Eyes: Extraocular  movement intact.    Head:  Normocephalic and atraumatic.  No lesions or scars.    Ears:  Pinnae and tragus non-tender.  EAC's and TM's were clear.     Nose:  Sinuses were non-tender.  Anterior rhinoscopy revealed midline septum and absence of purulence or polyps.    OC/OP:  Normal tongue, floor of mouth, buccal mucosa, and palate.  No lesions or masses on inspection or palpation.  No abnormal lymph tissue in the oropharynx.  Tonsils normal in appearance. The pterygoid region is non-tender.    Neck:  Supple with normal laryngeal and tracheal landmarks.  The parotid beds were without masses.  No palpable thyroid.  Lymphatic:  There is no palpable lymphadenopathy in the neck.         Assessment and Plan:  Submandibular swelling    Patient with a history of submandibular swelling. Due to her continued symptoms I have recommended proceeding with a sialoendoscopy and possible biopsy of tonsils. I informed her that at this point I would not remove her tonsils as it is not indicated based on her medical history. She is agreeable to the left submandibular sialoendoscopy and we will schedule this.      Scribe Disclosure:  I, Edmond Dowell, am serving as a scribe to document services personally performed by Mark Kate MD at this visit, based upon the provider's statements to me. All documentation has been reviewed by the aforementioned provider prior to being entered into the official medical record.         Again, thank you for allowing me to participate in the care of your patient.      Sincerely,    Mark Kate MD

## 2020-01-07 NOTE — PROGRESS NOTES
Otolaryngology Clinic      Name: Sruthi Escobar  MRN: [id number]  Age: 54 year old  : 2022      Chief Complaint:   Follow Up and Consult       History of Present Illness:   Sruthi Escobar is a 54 year old female who presents for follow up. The patient was previously evaluated on 12/3/19 for lymphadenopathy along her left mandible and there were no abnormalities on CT or concerning findings on exam. I recommended hydration and Xylitol as preventive measures at that time. Today she reports continued swelling of her lymph nodes and pain over her submandibular gland. She has also has frequent tonsillitis and wants her tonsils removed. She states she is on antibiotics between her tonsils and swelling every 2-3 months. She does get strep throat frequently.     Review of Systems:   Pertinent items are noted in HPI or as in patient entered ROS below, remainder of complete ROS is negative.    ENT ROS 12/3/2019   Constitutional Unexplained fatigue   Psychology Frequently feeling depressed or sad   Gastrointestinal/Genitourinary Heartburn/indigestion   Hematologic Lymph node swelling        Physical Exam:   BP (!) 133/97   Pulse 82   Wt 88.8 kg (195 lb 11.2 oz)   SpO2 99%   BMI 34.67 kg/m       PHYSICAL EXAMINATION:    Constitutional:  The patient was unaccompanied, well-groomed, and in no acute distress.    Skin:  Warm and pink.    Neurologic:  Alert and oriented x 3.  CN's III-XII within normal limits.  Voice normal.   Psychiatric:  The patient's affect was calm, cooperative, and appropriate.    Respiratory:  Breathing comfortably without stridor or exertion of accessory muscles.    Eyes: Extraocular movement intact.    Head:  Normocephalic and atraumatic.  No lesions or scars.    Ears:  Pinnae and tragus non-tender.  EAC's and TM's were clear.     Nose:  Sinuses were non-tender.  Anterior rhinoscopy revealed midline septum and absence of purulence or polyps.    OC/OP:  Normal tongue, floor  of mouth, buccal mucosa, and palate.  No lesions or masses on inspection or palpation.  No abnormal lymph tissue in the oropharynx.  Tonsils normal in appearance. The pterygoid region is non-tender.    Neck:  Supple with normal laryngeal and tracheal landmarks.  The parotid beds were without masses.  No palpable thyroid.  Lymphatic:  There is no palpable lymphadenopathy in the neck.         Assessment and Plan:  Submandibular swelling    Patient with a history of submandibular swelling. Due to her continued symptoms I have recommended proceeding with a sialoendoscopy and possible biopsy of tonsils. I informed her that at this point I would not remove her tonsils as it is not indicated based on her medical history. She is agreeable to the left submandibular sialoendoscopy and we will schedule this.      Scribe Disclosure:  I, Edmond Dowell, am serving as a scribe to document services personally performed by Mark Kate MD at this visit, based upon the provider's statements to me. All documentation has been reviewed by the aforementioned provider prior to being entered into the official medical record.

## 2020-01-07 NOTE — PATIENT INSTRUCTIONS
1. You were seen in the ENT Clinic today by Dr. Kate.  If you have any questions or concerns after your appointment, please call   - Option 1: ENT Clinic: [phone]  - Option 2: Dominga (Dr. Kate's Nurse): [phone]    2.  Our surgery scheduler will reach out to get you scheduled for surgery     Natice Schwab, RN  [facility]- Otolaryngology  [phone]

## 2020-01-09 ENCOUNTER — TELEPHONE (OUTPATIENT)
Dept: OTOLARYNGOLOGY | Facility: CLINIC | Age: 55
End: 2020-01-09

## 2020-01-09 PROBLEM — R22.1 SUBMANDIBULAR SWELLING: Status: ACTIVE | Noted: 2020-01-09

## 2020-01-09 PROBLEM — R22.0 SUBMANDIBULAR SWELLING: Status: ACTIVE | Noted: 2020-01-09

## 2020-01-09 NOTE — TELEPHONE ENCOUNTER
Left message regarding scheduling surgery with Dr. Kate. Call back number provided, [phone].       Shanti Meyer   Perioperative Coordinator  Department of Otolaryngology    Clinics and Surgery [address]  [address]  Office: [phone]  Fax: [phone]  [email or web address]

## 2020-01-10 NOTE — TELEPHONE ENCOUNTER
FUTURE VISIT INFORMATION      SURGERY INFORMATION:    Date: 1.20.20    Location:  OR    Surgeon: Dr. Kate    Anesthesia Type:General        RECORDS REQUESTED FROM:       Primary Care Provider:Sis Quan CNP, CSC    Most recent EKG with tracings/strips:10.20.15, [facility]    Action 1.10.2020 MJ 6:21 AM   Action Taken Requested EKG strips from [facility].

## 2020-01-10 NOTE — TELEPHONE ENCOUNTER
Patient called back to schedule with Dr. Kate. [facility] scheduled [place] [facility] [facility]. PAC appointment scheduled for next week for pre-op H&P.  Will return to see Mattie Umaña PA-C as Dr. Kate is out 1 week post op.   Packet and teaching done by KULWINDER Orr in clinic

## 2020-01-13 PROBLEM — J47.9 BRONCHIECTASIS WITHOUT COMPLICATION (H): Status: ACTIVE | Noted: 2018-07-24

## 2020-01-13 PROBLEM — E66.9 OBESITY: Status: ACTIVE | Noted: 2017-04-20

## 2020-01-13 PROBLEM — R91.8 LUNG NODULES: Status: ACTIVE | Noted: 2018-05-29

## 2020-01-13 PROBLEM — K63.89 MELANOSIS COLI: Status: ACTIVE | Noted: 2020-01-13

## 2020-01-15 ENCOUNTER — ANESTHESIA EVENT (OUTPATIENT)
Dept: SURGERY | Facility: AMBULATORY SURGERY CENTER | Age: 55
End: 2020-01-15

## 2020-01-15 ENCOUNTER — PRE VISIT (OUTPATIENT)
Dept: SURGERY | Facility: CLINIC | Age: 55
End: 2020-01-15

## 2020-01-15 ENCOUNTER — OFFICE VISIT (OUTPATIENT)
Dept: SURGERY | Facility: CLINIC | Age: 55
End: 2020-01-15
Payer: COMMERCIAL

## 2020-01-15 VITALS
TEMPERATURE: 98.2 F | HEIGHT: 64 IN | WEIGHT: 193.8 LBS | BODY MASS INDEX: 33.09 KG/M2 | HEART RATE: 84 BPM | SYSTOLIC BLOOD PRESSURE: 127 MMHG | DIASTOLIC BLOOD PRESSURE: 85 MMHG | RESPIRATION RATE: 16 BRPM | OXYGEN SATURATION: 99 %

## 2020-01-15 DIAGNOSIS — Z01.818 PRE-OP EVALUATION: ICD-10-CM

## 2020-01-15 DIAGNOSIS — R22.0 SUBMANDIBULAR SWELLING: ICD-10-CM

## 2020-01-15 DIAGNOSIS — R22.1 SUBMANDIBULAR SWELLING: ICD-10-CM

## 2020-01-15 DIAGNOSIS — Z01.818 PRE-OP EVALUATION: Primary | ICD-10-CM

## 2020-01-15 LAB
ANION GAP SERPL CALCULATED.3IONS-SCNC: 2 MMOL/L (ref 3–14)
BUN SERPL-MCNC: 11 MG/DL (ref 7–30)
CALCIUM SERPL-MCNC: 8.8 MG/DL (ref 8.5–10.1)
CHLORIDE SERPL-SCNC: 108 MMOL/L (ref 94–109)
CO2 SERPL-SCNC: 30 MMOL/L (ref 20–32)
CREAT SERPL-MCNC: 0.71 MG/DL (ref 0.52–1.04)
GFR SERPL CREATININE-BSD FRML MDRD: >90 ML/MIN/{1.73_M2}
GLUCOSE SERPL-MCNC: 95 MG/DL (ref 70–99)
POTASSIUM SERPL-SCNC: 3.9 MMOL/L (ref 3.4–5.3)
SODIUM SERPL-SCNC: 139 MMOL/L (ref 133–144)

## 2020-01-15 RX ORDER — ACETAMINOPHEN 325 MG/1
325-650 TABLET ORAL EVERY 6 HOURS PRN
COMMUNITY

## 2020-01-15 ASSESSMENT — PAIN SCALES - GENERAL: PAINLEVEL: NO PAIN (0)

## 2020-01-15 ASSESSMENT — MIFFLIN-ST. JEOR: SCORE: 1454.07

## 2020-01-15 ASSESSMENT — LIFESTYLE VARIABLES: TOBACCO_USE: 0

## 2020-01-15 NOTE — ANESTHESIA PREPROCEDURE EVALUATION
Anesthesia Pre-Procedure Evaluation    Patient: Sruthi Escobar   MRN:     [id number] Gender:   female   Age:    54 year old :      1965        Preoperative Diagnosis: Submandibular swelling [R22.0, R22.1]   Procedure(s):  Left submandibular sialendoscopy with possible tonsil biopsy     Past Medical History:   Diagnosis Date     Bronchiectasis (H)      Gastroesophageal reflux disease      Hypertension      Pain in shoulder region, left       No past surgical history on file.       Anesthesia Evaluation     . Pt has had prior anesthetic. Type: General    No history of anesthetic complications          ROS/MED HX    ENT/Pulmonary: Comment: Submandibular swelling    (+), . Other pulmonary disease bronchiectasis.   (-) tobacco use   Neurologic:  - neg neurologic ROS     Cardiovascular:     (+) hypertension----. : . . . :. . Previous cardiac testing date:results:date: results:ECG reviewed date: results:SR, LAD, abnormal EKG date: results:         (-) taking anticoagulants/antiplatelets   METS/Exercise Tolerance:  >4 METS   Hematologic:  - neg hematologic  ROS       Musculoskeletal:  - neg musculoskeletal ROS       GI/Hepatic:     (+) GERD (intermittent) Asymptomatic on medication,       Renal/Genitourinary:  - ROS Renal section negative       Endo:     (+) thyroid problem (simple goiter with surgery in ) .      Psychiatric:  - neg psychiatric ROS       Infectious Disease:  - neg infectious disease ROS       Malignancy:      - no malignancy   Other:    (+) no H/O Chronic Pain,                       PHYSICAL EXAM:   Mental Status/Neuro: A/A/O   Airway: Facies: Feasible  Mallampati: I  Mouth/Opening: Full  TM distance: > 6 cm  Neck ROM: Full   Respiratory: Auscultation: CTAB     Resp. Rate: Normal     Resp. Effort: Normal      CV: Rhythm: Regular  Rate: Age appropriate  Heart: Normal Sounds  Edema: None  Pulses: Normal   Comments:      Dental: Normal Dentition                LABS:  CBC:   Lab Results  "  Component Value Date    WBC 4.0 11/18/2019    WBC 4.0 12/18/2018    HGB 12.0 11/18/2019    HGB 12.1 12/18/2018    HCT 39.0 11/18/2019    HCT 38.4 12/18/2018     11/18/2019     12/18/2018     BMP:   Lab Results   Component Value Date     12/18/2018    POTASSIUM 3.9 12/18/2018    CHLORIDE 104 12/18/2018    CO2 28 12/18/2018    BUN 12 12/18/2018    CR 0.70 12/18/2018    GLC 85 12/18/2018     COAGS: No results found for: PTT, INR, FIBR  POC: No results found for: BGM, HCG, HCGS  OTHER:   Lab Results   Component Value Date    KAYLEE 8.8 12/18/2018    ALBUMIN 3.2 (L) 12/18/2018    PROTTOTAL 7.8 12/18/2018    ALT 26 12/18/2018    AST 17 12/18/2018    ALKPHOS 119 12/18/2018    BILITOTAL 0.4 12/18/2018    TSH 1.51 06/29/2018    CRP <2.9 11/21/2018    SED 28 11/21/2018        Preop Vitals    BP Readings from Last 3 Encounters:   01/07/20 (!) 133/97   12/03/19 (!) 155/98   11/18/19 (!) 147/94    Pulse Readings from Last 3 Encounters:   01/07/20 82   12/03/19 81   11/18/19 90      Resp Readings from Last 3 Encounters:   12/18/18 16   11/21/18 16   06/21/18 20    SpO2 Readings from Last 3 Encounters:   01/07/20 99%   11/18/19 99%   11/04/19 98%      Temp Readings from Last 1 Encounters:   11/04/19 99.2  F (37.3  C) (Oral)    Ht Readings from Last 1 Encounters:   12/18/18 1.6 m (5' 3\")      Wt Readings from Last 1 Encounters:   01/07/20 88.8 kg (195 lb 11.2 oz)    Estimated body mass index is 34.67 kg/m  as calculated from the following:    Height as of 12/18/18: 1.6 m (5' 3\").    Weight as of 1/7/20: 88.8 kg (195 lb 11.2 oz).     LDA:        Assessment:   ASA SCORE: 3      Smoking Status:  Non-Smoker/Unknown   NPO Status: NPO Appropriate     Plan:   Anes. Type:  General   Pre-Medication: None   Induction:  IV (Standard)   Airway: ETT; Oral   Access/Monitoring: PIV   Maintenance: Balanced     Postop Plan:   Postop Pain: Opioids  Postop Sedation/Airway: Not planned  Disposition: Outpatient     PONV Management: "   Adult Risk Factors: Female, Non-Smoker, Postop Opioids   Prevention: Ondansetron, Dexamethasone     CONSENT: Direct conversation   Plan and risks discussed with: Patient   Blood Products: Consent Deferred (Minimal Blood Loss)                PAC Discussion and Assessment    ASA Classification: 3  Case is suitable for: ASC  Anesthetic techniques and relevant risks discussed: GA  Invasive monitoring and risk discussed:   Types:   Possibility and Risk of blood transfusion discussed:   NPO instructions given:   Additional anesthetic preparation and risks discussed:   Needs early admission to pre-op area:   Other:     PAC Resident/NP Anesthesia Assessment:  Sruthi Escobar is a 54 year old female scheduled for Left submandibular sialendoscopy with possible tonsil biopsy on 1/20/20 by Dr. Kate in treatment of submandibular swelling.  PAC referral for risk assessment and optimization for anesthesia with comorbid conditions of hypertension, bronchiectasis, GERD:    Pre-operative considerations:  1.  Cardiac:  Functional status- METS >4.  Hypertension taking lisinopril (hold DOS) and hydrochlorothiazide (hold DOS). denies cardiac symptoms. EKG in 2015 showed SR, LAD. low risk surgery with 0.4% (RCRI #) risk of major adverse cardiac event.   2.  Pulm:  Airway feasible.  JENNIFER risk:  Intermediate. History of bronchiectasis, reports she has not needed to use her albuterol for about 1 year.   3.  GI:  Risk of PONV score = 3.  If > 2, anti-emetic intervention recommended.  H/o intermittent GERD well controlled with PRN omeprazole.  4.  ENT: submandibular swelling and recurrent tonsillitis with above procedure planned.     VTE risk: 0.26%    Patient is optimized and is acceptable candidate for the proposed procedure.  No further diagnostic evaluation is needed.     Patient discussed with Dr. North. She asked to speak with him about her upcoming anesthetic.     **For further details of assessment, testing, and physical exam  please see H and P completed on same date.      Isabella Gomez PA-C        Mid-Level Provider/Resident:   Date:   Time:     Attending Anesthesiologist Anesthesia Assessment:        Anesthesiologist:   Date:   Time:   Pass/Fail:   Disposition:     PAC Pharmacist Assessment:        Pharmacist:   Date:   Time:    Isabella Gomez PA-C

## 2020-01-15 NOTE — PATIENT INSTRUCTIONS
Preparing for Your Surgery      Name:  Sruthi Escobar   MRN:  [id number]   :  1965   Today's Date:  1/15/2020     Arriving for surgery:  Surgery date:  20  Arrival time:  07:30 am  Please come to:     [facility]  [address] [place] [id number]     Parking is available in front of the Clinics and Surgery Center building from 5:30AM to 8:00PM.  -  Proceed to the 5th floor to check into the Ambulatory Surgery Center.              >> There will be patient concierges on the 1st and 5th floor, for assistance or an escort, if you would like.              >> Please call [phone] with any questions.    What can I eat or drink?  -  You may have solid food or milk products until 8 hours prior to your surgery.  -  You may have water, apple juice or 7up/Sprite until 2 hours prior to your surgery.    Which medicines can I take?  Stop Aspirin, vitamins and supplements one week prior to surgery.  Hold Ibuprofen for 24 hours and/or Naproxen for 48 hours prior to surgery.   -  Do NOT take these medications in the morning, the day of surgery:  Iron + microzide + lisinopril if normally taken in the morning.  -  Please take these medications the day of surgery:  Tylenol if needed; take all other scheduled medications normally taken in the morning.    How do I prepare myself?  -  Take two showers: one the night before surgery; and one the morning of surgery.         Use Scrubcare or Hibiclens to wash from neck down, leave soap on your skin for up to one minute.  Do not get soap in your eyes or ears.  You may use your own shampoo and conditioner; no other hair products.   -  Do NOT use lotion, powder, deodorant, or antiperspirant the day of your surgery.  -  Do NOT wear any makeup, fingernail polish or jewelry.  - Do not bring your own medications to the hospital, except for inhalers and eye   drops.  -  Bring your ID and insurance card.    -If you are scheduled to go home  the Same Day as surgery you must have a responsible adult as a  and to stay with you overnight the first 24 hours after surgery.     Questions or Concerns:  -If you are scheduled on the East or West campus and have questions or concerns regarding the day of surgery, please call Preadmission Nursing at [phone].     -If you are scheduled at the Ambulatory Surgery Center and have questions or concerns regarding the day of surgery please call [phone].    -If you have health changes between today and your surgery please call your surgeon. For questions after surgery please call your surgeons office.

## 2020-01-15 NOTE — H&P
Pre-Operative H & P     CC:  Preoperative exam to assess for increased cardiopulmonary risk while undergoing surgery and anesthesia.    Date of Encounter: 1/15/2020  Primary Care Physician:  Sis Quan  associated diagnosis: submandibular swelling    HPI  Sruthi Escobar is a 54 year old female who presents for pre-operative H & P in preparation for Left submandibular sialendoscopy with possible tonsil biopsy with Dr. Kate on 1/20/20 at [facility]. Patient is being evaluated for comorbid conditions of hypertension, bronchiectasis, GERD     Ms. Escobar has a history of lymphadenopathy along left mandible. She has been seen by ENT and it was previously recommended to use hydration and xylitol for preventive measures. At follow up with ENT she reported continuing swelling in addition to frequent tonsillitis. Above procedure planned.    History is obtained from the patient and chart review.      Past Medical History  Past Medical History:   Diagnosis Date     Bronchiectasis (H)      Gastroesophageal reflux disease      Hypertension      Pain in shoulder region, left        Past Surgical History  Past Surgical History:   Procedure Laterality Date     BUNIONECTOMY Bilateral      C/SECTION, CLASSICAL      x2     partial thyroidectomy         Hx of Blood transfusions/reactions: denies     Hx of abnormal bleeding or anti-platelet use: denies    Menstrual history: No LMP recorded. Patient is postmenopausal.    Personal or FH with difficulty with Anesthesia:  denies    Prior to Admission Medications  Current Outpatient Medications   Medication Sig Dispense Refill     calcium-vitamin D (CALTRATE) 600-400 MG-UNIT per tablet Take 1 tablet by mouth as needed (PT last dose a week ago 1.15.2020)        ferrous sulfate ([employer] IRON) 325 (65 Fe) MG tablet Take 325 mg by mouth as needed (PT last dose a week ago 1.15.2020)        hydrochlorothiazide (MICROZIDE) 12.5 MG capsule Take 1 capsule (12.5  mg) by mouth daily (Patient taking differently: Take 12.5 mg by mouth every other day ) 90 capsule 3     lisinopril (PRINIVIL/ZESTRIL) 10 MG tablet Take 1 tablet (10 mg) by mouth daily (Patient taking differently: Take 10 mg by mouth every morning ) 90 tablet 3     Multiple Vitamin (MULTIVITAMIN  S) CAPS Take 1 capsule by mouth as needed (PT last dose a week ago 1.15.2020)        acetaminophen (TYLENOL) 325 MG tablet Take 325-650 mg by mouth every 6 hours as needed for mild pain (PT last dose approx 2 weeks ago 1.15.2020)       albuterol (PROAIR HFA/PROVENTIL HFA/VENTOLIN HFA) 108 (90 Base) MCG/ACT Inhaler Inhale 2 puffs into the lungs every 6 hours (Patient taking differently: Inhale 2 puffs into the lungs as needed ) 1 Inhaler 3       Allergies  Allergies   Allergen Reactions     Prochlorperazine Anxiety and Itching     Phenothiazines Rash       Social History  Social History     Socioeconomic History     Marital status:      Spouse name: Not on file     Number of children: Not on file     Years of education: Not on file     Highest education level: Not on file   Occupational History     Not on file   Social Needs     Financial resource strain: Not on file     Food insecurity:     Worry: Not on file     Inability: Not on file     Transportation needs:     Medical: Not on file     Non-medical: Not on file   Tobacco Use     Smoking status: Never Smoker     Smokeless tobacco: Never Used   Substance and Sexual Activity     Alcohol use: No     Drug use: No     Sexual activity: Yes     Partners: Male   Lifestyle     Physical activity:     Days per week: Not on file     Minutes per session: Not on file     Stress: Not on file   Relationships     Social connections:     Talks on phone: Not on file     Gets together: Not on file     Attends Caodaism service: Not on file     Active member of club or organization: Not on file     Attends meetings of clubs or organizations: Not on file     Relationship status: Not on  "file     Intimate partner violence:     Fear of current or ex partner: Not on file     Emotionally abused: Not on file     Physically abused: Not on file     Forced sexual activity: Not on file   Other Topics Concern     Not on file   Social History Narrative     Not on file       Family History  Family History   Problem Relation Age of Onset     Hypertension Mother      Uterine Cancer Mother      Heart Disease Father      Hypertension Maternal Grandmother      Heart Disease Maternal Grandfather      Anesthesia Reaction No family hx of      Deep Vein Thrombosis (DVT) No family hx of          ROS/MED HX    ENT/Pulmonary: Comment: Submandibular swelling    (+), . Other pulmonary disease bronchiectasis.   (-) tobacco use   Neurologic:  - neg neurologic ROS     Cardiovascular:     (+) hypertension----. : . . . :. . Previous cardiac testing date:results:date: results:ECG reviewed date:2015 results:SR, LAD, abnormal EKG date: results:         (-) taking anticoagulants/antiplatelets   METS/Exercise Tolerance:  >4 METS   Hematologic:  - neg hematologic  ROS       Musculoskeletal:  - neg musculoskeletal ROS       GI/Hepatic:     (+) GERD (intermittent) Asymptomatic on medication,       Renal/Genitourinary:  - ROS Renal section negative       Endo:     (+) thyroid problem (simple goiter with surgery in 1994) .      Psychiatric:  - neg psychiatric ROS       Infectious Disease:  - neg infectious disease ROS       Malignancy:      - no malignancy   Other:    (+) no H/O Chronic Pain,         The complete review of systems is negative other than noted in the HPI or here.   Temp: 98.2  F (36.8  C) Temp src: Oral BP: 127/85 Pulse: 84   Resp: 16 SpO2: 99 %         193 lbs 12.8 oz  5' 4\"   Body mass index is 33.27 kg/m .       Physical Exam  Constitutional: Awake, alert, cooperative, no apparent distress, and appears stated age.  Eyes: Pupils equal, round and reactive to light, extra ocular muscles intact, sclera clear, conjunctiva " normal.  HENT: Normocephalic, oral pharynx with moist mucus membranes, good dentition. Respiratory: Clear to auscultation bilaterally, no crackles or wheezing.  Cardiovascular: Regular rate and rhythm, normal S1 and S2, and no murmur noted.  Carotids +2, no bruits. No edema. Palpable pulses to radial arteries.   GI: Normal bowel sounds, soft, non-distended, non-tender, no masses palpated, no hepatosplenomegaly.    Genitourinary:  deferred  Skin: Warm and dry.    Musculoskeletal: Full ROM of neck. There is no redness, warmth, or swelling of the exposed joints. Gross motor strength is normal.    Neurologic: Awake, alert, oriented to name, place and time. Cranial nerves II-XII are grossly intact. Gait is normal.   Neuropsychiatric: Calm, cooperative. Normal affect.     Labs: (personally reviewed)  Component      Latest Ref Rng & Units 11/18/2019   WBC      4.0 - 11.0 10e9/L 4.0   RBC Count      3.8 - 5.2 10e12/L 4.54   Hemoglobin      11.7 - 15.7 g/dL 12.0   Hematocrit      35.0 - 47.0 % 39.0   MCV      78 - 100 fl 86   MCH      26.5 - 33.0 pg 26.4 (L)   MCHC      31.5 - 36.5 g/dL 30.8 (L)   RDW      10.0 - 15.0 % 13.8   Platelet Count      150 - 450 10e9/L 232   Diff Method       Automated Method   % Neutrophils      % 40.2   % Lymphocytes      % 46.6   % Monocytes      % 11.1   % Eosinophils      % 1.3   % Basophils      % 0.5   % Immature Granulocytes      % 0.3   Nucleated RBCs      0 /100 0   Absolute Neutrophil      1.6 - 8.3 10e9/L 1.6   Absolute Lymphocytes      0.8 - 5.3 10e9/L 1.9   Absolute Monocytes      0.0 - 1.3 10e9/L 0.4   Absolute Eosinophils      0.0 - 0.7 10e9/L 0.1   Absolute Basophils      0.0 - 0.2 10e9/L 0.0   Abs Immature Granulocytes      0 - 0.4 10e9/L 0.0   Absolute Nucleated RBC       0.0   Ferritin      8 - 252 ng/mL 53     Component      Latest Ref Rng & Units 1/15/2020   Sodium      133 - 144 mmol/L 139   Potassium      3.4 - 5.3 mmol/L 3.9   Chloride      94 - 109 mmol/L 108   Carbon  Dioxide      20 - 32 mmol/L 30   Anion Gap      3 - 14 mmol/L 2 (L)   Glucose      70 - 99 mg/dL 95   Urea Nitrogen      7 - 30 mg/dL 11   Creatinine      0.52 - 1.04 mg/dL 0.71   GFR Estimate      >60 mL/min/1.73:m2 >90   GFR Estimate If Black      >60 mL/min/1.73:m2 >90   Calcium      8.5 - 10.1 mg/dL 8.8       EKG 2015  SR, LAD     CT soft tissue neck 11/2019  Impression:  1. No suspicious mass or adenopathy in the neck.  2. Few prominent left level 1b cervical lymph nodes likely correspond  to the palpable abnormality along the left mandible. These are normal  according to CT criteria.  3. Heterogeneous thyroid gland with few small thyroid nodules which  require no further follow up in this age group.    Outside records reviewed from: care everywhere    ASSESSMENT and PLAN  Sruthi Escobar is a 54 year old female scheduled for Left submandibular sialendoscopy with possible tonsil biopsy on 1/20/20 by Dr. Kate in treatment of submandibular swelling.  PAC referral for risk assessment and optimization for anesthesia with comorbid conditions of hypertension, bronchiectasis, GERD:    Pre-operative considerations:  1.  Cardiac:  Functional status- METS >4.  Hypertension taking lisinopril (hold DOS) and hydrochlorothiazide (hold DOS). denies cardiac symptoms. EKG in 2015 showed SR, LAD. low risk surgery with 0.4% (RCRI #) risk of major adverse cardiac event.   2.  Pulm:  Airway feasible.  JENNIFER risk:  Intermediate. History of bronchiectasis, reports she has not needed to use her albuterol for about 1 year.   3.  GI:  Risk of PONV score = 3.  If > 2, anti-emetic intervention recommended.  H/o intermittent GERD well controlled with PRN omeprazole.  4.  ENT: submandibular swelling and recurrent tonsillitis with above procedure planned.     VTE risk: 0.26%    Patient is optimized and is acceptable candidate for the proposed procedure.  No further diagnostic evaluation is needed.     Patient discussed with Dr. North. She  asked to speak with him about her upcoming anesthetic.     Isabella Gomez PA-C  Preoperative Assessment Center  [facility]  Clinic and Surgery Center  Phone: [phone]  Fax: [phone]

## 2020-01-20 ENCOUNTER — ANESTHESIA (OUTPATIENT)
Dept: SURGERY | Facility: AMBULATORY SURGERY CENTER | Age: 55
End: 2020-01-20

## 2020-01-20 ENCOUNTER — HOSPITAL ENCOUNTER (OUTPATIENT)
Facility: AMBULATORY SURGERY CENTER | Age: 55
End: 2020-01-20
Attending: OTOLARYNGOLOGY
Payer: COMMERCIAL

## 2020-01-20 VITALS
BODY MASS INDEX: 33.29 KG/M2 | RESPIRATION RATE: 16 BRPM | TEMPERATURE: 98 F | HEART RATE: 93 BPM | SYSTOLIC BLOOD PRESSURE: 141 MMHG | WEIGHT: 195 LBS | OXYGEN SATURATION: 99 % | HEIGHT: 64 IN | DIASTOLIC BLOOD PRESSURE: 98 MMHG

## 2020-01-20 DIAGNOSIS — R22.1 SUBMANDIBULAR SWELLING: ICD-10-CM

## 2020-01-20 DIAGNOSIS — R22.0 SUBMANDIBULAR SWELLING: ICD-10-CM

## 2020-01-20 RX ORDER — ONDANSETRON 2 MG/ML
INJECTION INTRAMUSCULAR; INTRAVENOUS PRN
Status: DISCONTINUED | OUTPATIENT
Start: 2020-01-20 | End: 2020-01-20

## 2020-01-20 RX ORDER — ONDANSETRON 2 MG/ML
4 INJECTION INTRAMUSCULAR; INTRAVENOUS EVERY 30 MIN PRN
Status: DISCONTINUED | OUTPATIENT
Start: 2020-01-20 | End: 2020-01-21 | Stop reason: HOSPADM

## 2020-01-20 RX ORDER — ACETAMINOPHEN 325 MG/1
975 TABLET ORAL ONCE
Status: COMPLETED | OUTPATIENT
Start: 2020-01-20 | End: 2020-01-20

## 2020-01-20 RX ORDER — NALOXONE HYDROCHLORIDE 0.4 MG/ML
.1-.4 INJECTION, SOLUTION INTRAMUSCULAR; INTRAVENOUS; SUBCUTANEOUS
Status: DISCONTINUED | OUTPATIENT
Start: 2020-01-20 | End: 2020-01-21 | Stop reason: HOSPADM

## 2020-01-20 RX ORDER — LIDOCAINE 40 MG/G
CREAM TOPICAL
Status: DISCONTINUED | OUTPATIENT
Start: 2020-01-20 | End: 2020-01-20 | Stop reason: HOSPADM

## 2020-01-20 RX ORDER — SODIUM CHLORIDE, SODIUM LACTATE, POTASSIUM CHLORIDE, CALCIUM CHLORIDE 600; 310; 30; 20 MG/100ML; MG/100ML; MG/100ML; MG/100ML
INJECTION, SOLUTION INTRAVENOUS CONTINUOUS
Status: DISCONTINUED | OUTPATIENT
Start: 2020-01-20 | End: 2020-01-20 | Stop reason: HOSPADM

## 2020-01-20 RX ORDER — ONDANSETRON 4 MG/1
4 TABLET, ORALLY DISINTEGRATING ORAL EVERY 30 MIN PRN
Status: DISCONTINUED | OUTPATIENT
Start: 2020-01-20 | End: 2020-01-21 | Stop reason: HOSPADM

## 2020-01-20 RX ORDER — GABAPENTIN 300 MG/1
300 CAPSULE ORAL ONCE
Status: COMPLETED | OUTPATIENT
Start: 2020-01-20 | End: 2020-01-20

## 2020-01-20 RX ORDER — FENTANYL CITRATE 50 UG/ML
25-50 INJECTION, SOLUTION INTRAMUSCULAR; INTRAVENOUS
Status: DISCONTINUED | OUTPATIENT
Start: 2020-01-20 | End: 2020-01-20 | Stop reason: HOSPADM

## 2020-01-20 RX ORDER — DEXAMETHASONE SODIUM PHOSPHATE 4 MG/ML
INJECTION, SOLUTION INTRA-ARTICULAR; INTRALESIONAL; INTRAMUSCULAR; INTRAVENOUS; SOFT TISSUE PRN
Status: DISCONTINUED | OUTPATIENT
Start: 2020-01-20 | End: 2020-01-20

## 2020-01-20 RX ORDER — FENTANYL CITRATE 50 UG/ML
INJECTION, SOLUTION INTRAMUSCULAR; INTRAVENOUS PRN
Status: DISCONTINUED | OUTPATIENT
Start: 2020-01-20 | End: 2020-01-20

## 2020-01-20 RX ORDER — PROPOFOL 10 MG/ML
INJECTION, EMULSION INTRAVENOUS PRN
Status: DISCONTINUED | OUTPATIENT
Start: 2020-01-20 | End: 2020-01-20

## 2020-01-20 RX ORDER — AMPICILLIN AND SULBACTAM 2; 1 G/1; G/1
3 INJECTION, POWDER, FOR SOLUTION INTRAMUSCULAR; INTRAVENOUS
Status: COMPLETED | OUTPATIENT
Start: 2020-01-20 | End: 2020-01-20

## 2020-01-20 RX ORDER — MEPERIDINE HYDROCHLORIDE 25 MG/ML
12.5 INJECTION INTRAMUSCULAR; INTRAVENOUS; SUBCUTANEOUS
Status: DISCONTINUED | OUTPATIENT
Start: 2020-01-20 | End: 2020-01-21 | Stop reason: HOSPADM

## 2020-01-20 RX ORDER — SODIUM CHLORIDE, SODIUM LACTATE, POTASSIUM CHLORIDE, CALCIUM CHLORIDE 600; 310; 30; 20 MG/100ML; MG/100ML; MG/100ML; MG/100ML
INJECTION, SOLUTION INTRAVENOUS CONTINUOUS
Status: DISCONTINUED | OUTPATIENT
Start: 2020-01-20 | End: 2020-01-21 | Stop reason: HOSPADM

## 2020-01-20 RX ORDER — SODIUM CHLORIDE, SODIUM LACTATE, POTASSIUM CHLORIDE, CALCIUM CHLORIDE 600; 310; 30; 20 MG/100ML; MG/100ML; MG/100ML; MG/100ML
INJECTION, SOLUTION INTRAVENOUS CONTINUOUS PRN
Status: DISCONTINUED | OUTPATIENT
Start: 2020-01-20 | End: 2020-01-20

## 2020-01-20 RX ORDER — AMPICILLIN AND SULBACTAM 1; .5 G/1; G/1
1.5 INJECTION, POWDER, FOR SOLUTION INTRAMUSCULAR; INTRAVENOUS SEE ADMIN INSTRUCTIONS
Status: DISCONTINUED | OUTPATIENT
Start: 2020-01-20 | End: 2020-01-20 | Stop reason: HOSPADM

## 2020-01-20 RX ORDER — LIDOCAINE HYDROCHLORIDE 20 MG/ML
INJECTION, SOLUTION INFILTRATION; PERINEURAL PRN
Status: DISCONTINUED | OUTPATIENT
Start: 2020-01-20 | End: 2020-01-20

## 2020-01-20 RX ORDER — CHLORHEXIDINE GLUCONATE ORAL RINSE 1.2 MG/ML
SOLUTION DENTAL PRN
Status: DISCONTINUED | OUTPATIENT
Start: 2020-01-20 | End: 2020-01-20 | Stop reason: HOSPADM

## 2020-01-20 RX ORDER — PROPOFOL 10 MG/ML
INJECTION, EMULSION INTRAVENOUS CONTINUOUS PRN
Status: DISCONTINUED | OUTPATIENT
Start: 2020-01-20 | End: 2020-01-20

## 2020-01-20 RX ADMIN — PROPOFOL 150 MCG/KG/MIN: 10 INJECTION, EMULSION INTRAVENOUS at 09:54

## 2020-01-20 RX ADMIN — ACETAMINOPHEN 975 MG: 325 TABLET ORAL at 07:57

## 2020-01-20 RX ADMIN — SODIUM CHLORIDE, SODIUM LACTATE, POTASSIUM CHLORIDE, CALCIUM CHLORIDE: 600; 310; 30; 20 INJECTION, SOLUTION INTRAVENOUS at 09:46

## 2020-01-20 RX ADMIN — PROPOFOL 50 MG: 10 INJECTION, EMULSION INTRAVENOUS at 10:08

## 2020-01-20 RX ADMIN — FENTANYL CITRATE 50 MCG: 50 INJECTION, SOLUTION INTRAMUSCULAR; INTRAVENOUS at 09:53

## 2020-01-20 RX ADMIN — Medication 50 MG: at 09:53

## 2020-01-20 RX ADMIN — GABAPENTIN 300 MG: 300 CAPSULE ORAL at 07:57

## 2020-01-20 RX ADMIN — LIDOCAINE HYDROCHLORIDE 100 MG: 20 INJECTION, SOLUTION INFILTRATION; PERINEURAL at 09:53

## 2020-01-20 RX ADMIN — FENTANYL CITRATE 50 MCG: 50 INJECTION, SOLUTION INTRAMUSCULAR; INTRAVENOUS at 10:00

## 2020-01-20 RX ADMIN — DEXAMETHASONE SODIUM PHOSPHATE 4 MG: 4 INJECTION, SOLUTION INTRA-ARTICULAR; INTRALESIONAL; INTRAMUSCULAR; INTRAVENOUS; SOFT TISSUE at 10:04

## 2020-01-20 RX ADMIN — AMPICILLIN AND SULBACTAM 3 G: 2; 1 INJECTION, POWDER, FOR SOLUTION INTRAMUSCULAR; INTRAVENOUS at 09:56

## 2020-01-20 RX ADMIN — PROPOFOL 200 MG: 10 INJECTION, EMULSION INTRAVENOUS at 09:53

## 2020-01-20 RX ADMIN — ONDANSETRON 4 MG: 2 INJECTION INTRAMUSCULAR; INTRAVENOUS at 10:04

## 2020-01-20 ASSESSMENT — MIFFLIN-ST. JEOR: SCORE: 1459.51

## 2020-01-20 NOTE — DISCHARGE INSTRUCTIONS
[facility]  Home Care Following Anesthesia  For 24 hours after surgery:  1. Get plenty of rest.  A responsible adult must stay with you for at least 24 hours after you leave the surgery center.  2. Do not drive or use heavy equipment.  If you have weakness or tingling, don't drive or use heavy equipment until this feeling goes away.   3. Do not drink alcohol.   4. Avoid strenuous or risky activities.  Ask for help when climbing stairs.  5. You may feel lightheaded.  IF so, sit for a few minutes before standing.  Have someone help you get up.   6. If you have nausea (feel sick to your stomach): Drink only clear liquids such as apple juice, ginger ale, broth or 7-Up.  Rest may also help.  Be sure to drink enough fluids.  Move to a regular diet as you feel able.   7. You may have a slight fever.  Call the doctor if your fever is over 100 F (37.7 C) (taken under the tongue) or lasts longer than 24 hours.  8. You may have a dry mouth, a sore throat, muscle aches or trouble sleeping. These should go away after 24 hours.  9. Do not make important or legal decisions.        Tips for taking pain medications  To get the best pain relief possible, remember these points:    Take pain medications as directed, before pain becomes severe.    Pain medication can upset your stomach: taking it with food may help.    Constipation is a common side effect of pain medication. Drink plenty of  fluids.    Eat foods high in fiber. Take a stool softener if recommended by your doctor or pharmacist.    Do not drink alcohol, drive or operate machinery while taking pain medications.    Ask about other ways to control pain, such as with heat, ice or relaxation.    Tylenol/Acetaminophen Consumption  To help encourage the safe use of acetaminophen, the makers of TYLENOL  have lowered the maximum daily dose for single-ingredient Extra Strength TYLENOL  (acetaminophen) products sold in the [place] from 8 pills per  day (4,000 mg) to 6 pills per day (3,000 mg). The dosing interval has also changed from 2 pills every 4-6 hours to 2 pills every 6 hours.    If you feel your pain relief is insufficient, you may take Tylenol/Acetaminophen in addition to your narcotic pain medication.     Be careful not to exceed 3,000 mg of Tylenol/Acetaminophen in a 24 hour period from all sources.    If you are taking extra strength Tylenol/acetaminophen (500 mg), the maximum dose is 6 tablets in 24 hours.    If you are taking regular strength acetaminophen (325 mg), the maximum dose is 9 tablets in 24 hours.  Last dose of Tylenol:  975 mg at 7:57 am.  Next dose after 1:57 pm, if needed.  Follow package instructions.    Call a doctor for any of the followin. Signs of infection (fever, growing tenderness at the surgery site, a large amount of drainage or bleeding, severe pain, foul-smelling drainage, redness, swelling).  2. It has been over 8 to 10 hours since surgery and you are still not able to urinate (pass water).  3. Headache for over 24 hours.  Your doctor is:       Dr. Mark Kate, ENT Otolaryngology: [phone]               Or dial [phone] and ask for the resident on call for:  ENT Otolaryngology  For emergency care, call the:  [place] [place] [facility]:  [phone] (TTY for hearing impaired: [phone])

## 2020-01-20 NOTE — ANESTHESIA POSTPROCEDURE EVALUATION
Anesthesia POST Procedure Evaluation    Patient: Sruthi Escobar   MRN:     [id number] Gender:   female   Age:    54 year old :      1965        Preoperative Diagnosis: Submandibular swelling [R22.0, R22.1]   Procedure(s):  Left Submandibular Sialendoscopy   Postop Comments: No value filed.       Anesthesia Type:  Not documented  No value filed.    Reportable Event: NO     PAIN: Uncomplicated   Sign Out status: Comfortable, Well controlled pain     PONV: No PONV   Sign Out status:  No Nausea or Vomiting     Neuro/Psych: Uneventful perioperative course   Sign Out Status: Preoperative baseline; Age appropriate mentation     Airway/Resp.: Uneventful perioperative course   Sign Out Status: Non labored breathing, age appropriate RR; Resp. Status within EXPECTED Parameters     CV: Uneventful perioperative course   Sign Out status: Appropriate BP and perfusion indices; Appropriate HR/Rhythm     Disposition:   Sign Out in:  Phase II  Disposition:  Home  Recovery Course: Uneventful  Follow-Up: Not required           Last Anesthesia Record Vitals:  CRNA VITALS  2020 1020 - 2020 1120      2020             Resp Rate (set):  10          Last PACU Vitals:  Vitals Value Taken Time   /104 2020 11:15 AM   Temp 36.4  C (97.6  F) 2020 11:10 AM   Pulse 91 2020 11:15 AM   Resp 12 2020 11:10 AM   SpO2 96 % 2020 11:16 AM   Temp src Skin 2020 10:50 AM   NIBP     Pulse     SpO2     Resp     Temp     Ht Rate     Temp 2     Vitals shown include unvalidated device data.      Electronically Signed By: Jesus Bajwa MD, 2020, 11:47 AM

## 2020-01-20 NOTE — OP NOTE
Procedure Date: 01/20/2020      PREOPERATIVE DIAGNOSIS:  Submandibular sialadenitis.      POSTOPERATIVE DIAGNOSIS:  Submandibular sialadenitis.      PROCEDURES:     1.  Left submandibular sialendoscopy.     2.  Tonsil exam under anesthesia.      PRIMARY SURGEON:  Mark Kate MD      ASSISTANT SURGEON:  Brandi Turner MD, Resident      ANESTHESIA:  General.      FINDINGS:  Left Simpson duct clear down to the bifurcation.      INDICATIONS FOR PROCEDURE:  Sruthi Escobar is a 54-year-old female with a history of cervical lymphadenopathy and left submandibular swelling.  CT scan was negative for sialolithiasis; however, the patient wished to pursue further exam of the submandibular duct.  The above procedure was recommended, and the patient elected to go forward with the procedure after a detailed explanation of the risks, benefits and alternatives.      DESCRIPTION OF PROCEDURE:  The patient was brought into the operating room and placed supine on the operating table.  General anesthesia was induced and maintained via endotracheal intubation.  The bed was turned 90 degrees.  The patient was then prepped and draped in the usual clean fashion.  A time out was performed identifying the patient and procedure, and all were in agreement.  The left Simpson duct was identified and dilated through the papilla.  This was somewhat difficult to advance deep into the duct.  A 1.7 sialendoscope was then introduced.  This was passed through the duct down to the bifurcation with no obvious stricture and no stones identified.  The duct was irrigated with 10 mL of normal saline.  The endoscope was then removed.  Bilateral tonsils were palpated and found to be soft and without obvious abnormality.  This concluded the procedure, and the patient was turned back over to Anesthesia.  The patient awakened uneventfully and was returned to the PACU in stable condition.      ESTIMATED BLOOD LOSS:  Less than 1 mL.      COMPLICATIONS:   None.      SPECIMENS:  None.      COMPLICATIONS:  None.      Dr. Mark Kate was present and scrubbed for all portions of the procedure.      Dictated by Brandi Turner MD   Resident         MARK KATE MD       As dictated by BRANDI TURNER MD            D: 2020   T: 2020   MT: genie      Name:     TYRA COLES   MRN:      [id number]        Account:        [id number]   :      1965           Procedure Date: 2020      Document: [id number]

## 2020-01-20 NOTE — BRIEF OP NOTE
[facility]    Brief Operative Note    Pre-operative diagnosis: Submandibular swelling [R22.0, R22.1]  Post-operative diagnosis Same as pre-operative diagnosis    Procedure: Procedure(s):  Left Submandibular Sialendoscopy  Surgeon: Surgeon(s) and Role:     * Mark Kate MD - Primary  Anesthesia: General   Estimated blood loss: Minimal  Drains: None  Specimens: * No specimens in log *  Findings:   Left Ashton's duct clear down to bifurcation. .  Complications: None.  Implants: * No implants in log *

## 2020-01-20 NOTE — ANESTHESIA CARE TRANSFER NOTE
Patient: Sruthi Escobar    Procedure(s):  Left Submandibular Sialendoscopy    Diagnosis: Submandibular swelling [R22.0, R22.1]  Diagnosis Additional Information: No value filed.    Anesthesia Type:   No value filed.     Note:  Airway :Face Mask  Patient transferred to:PACU  Handoff Report: Identifed the Patient, Identified the Reponsible Provider, Reviewed the pertinent medical history, Discussed the surgical course, Reviewed Intra-OP anesthesia mangement and issues during anesthesia, Set expectations for post-procedure period and Allowed opportunity for questions and acknowledgement of understanding      Vitals: (Last set prior to Anesthesia Care Transfer)    CRNA VITALS  1/20/2020 1022 - 1/20/2020 1052      1/20/2020             Resp Rate (observed):  8    Resp Rate (set):  10                Electronically Signed By: RAUL Carrion CRNA  January 20, 2020  10:52 AM

## 2020-01-22 ENCOUNTER — TELEPHONE (OUTPATIENT)
Dept: OTOLARYNGOLOGY | Facility: CLINIC | Age: 55
End: 2020-01-22

## 2020-01-22 NOTE — TELEPHONE ENCOUNTER
Pt called to check-in post operatively. No answer. Voicemail left with direct line for call-back if questions/concerns arise.     Natice Schwab, RN BSN

## 2020-01-28 ENCOUNTER — OFFICE VISIT (OUTPATIENT)
Dept: OTOLARYNGOLOGY | Facility: CLINIC | Age: 55
End: 2020-01-28
Payer: COMMERCIAL

## 2020-01-28 VITALS
SYSTOLIC BLOOD PRESSURE: 127 MMHG | RESPIRATION RATE: 18 BRPM | WEIGHT: 194 LBS | TEMPERATURE: 97.5 F | HEART RATE: 88 BPM | HEIGHT: 64 IN | BODY MASS INDEX: 33.12 KG/M2 | DIASTOLIC BLOOD PRESSURE: 80 MMHG

## 2020-01-28 DIAGNOSIS — R22.0 SUBMANDIBULAR SWELLING: Primary | ICD-10-CM

## 2020-01-28 DIAGNOSIS — R22.1 SUBMANDIBULAR SWELLING: Primary | ICD-10-CM

## 2020-01-28 ASSESSMENT — PAIN SCALES - GENERAL: PAINLEVEL: NO PAIN (0)

## 2020-01-28 ASSESSMENT — MIFFLIN-ST. JEOR: SCORE: 1454.98

## 2020-01-28 NOTE — PATIENT INSTRUCTIONS
Sruthi Escobar,    It was a pleasure to see you today.    1. You were seen in the ENT Clinic today by Mattie Umaña PA-C.  If you have any questions or concerns after your appointment, please call   - Option 1: ENT Clinic: [phone]  - Option 2: [facility] (Dr. Kate's Nurse): [phone]    2. Please return if symptoms recur.    3. Warm compresses and gentle massage may help the swelling go down.  It can take 6-8 weeks, however.    Thank you,  Mattie Umaña PA-C  Otolaryngology  Head & Neck Surgery  [phone]

## 2020-01-28 NOTE — NURSING NOTE
"Chief Complaint   Patient presents with     RECHECK     1 week post op    \Blood pressure 127/80, pulse 88, temperature 97.5  F (36.4  C), resp. rate 18, height 1.626 m (5' 4\"), weight 88 kg (194 lb).    Lj Pedro LPN    "

## 2020-01-28 NOTE — PROGRESS NOTES
"[facility], Nose [facility] Throat Clinic Follow Up Visit Note  Otolaryngology    January 28, 2020       HPI:  Sruthi Escobar is a 54 year old female who presents for a post-op follow up visit.  She had a left submandibular sialendoscopy on 1/20/2020 by Dr. Kate. Per the op note,  the left [place] duct clear down to the bifurcation. There was no obvious stricture and no stones identified.    Sruthi reports that she is doing well.  She had some swelling and some discomfort for about 3 days postoperatively.  All of that has gone away except for a little bit of swelling of the submandibular gland itself and the duct itself under the tongue is slightly swollen still.    She has not had any fevers or chills.  No drainage.  No debris from the duct.  She feels quite good today.    REVIEW OF SYSTEMS:  10 point ROS was negative other than the symptoms noted above in the HPI.    Physical Exam:  /80   Pulse 88   Temp 97.5  F (36.4  C)   Resp 18   Ht 1.626 m (5' 4\")   Wt 88 kg (194 lb)   BMI 33.30 kg/m      Constitutional: The patient was unaccompanied, well-groomed, and in no acute distress.    Mouth: Mucosa pink and moist, tonsils non-erythematous, no exudates, uvula midline, Ramakrishna duct slightly swollen, non-tender, no debris or pus expressed from duct  Neck: slight swelling of the left submandibular gland, non-tender. No lymphadenopathy in the neck.  No palpable thyroid.  Normal range of motion  Respiratory: Breathing comfortably without stridor or exertion of accessory muscles.   Skin: Normal:  warm and pink without rash  Neurologic: Alert and oriented x 3.  CN's III-XII within normal limits.  Voice normal.   Psychiatric: The patient's affect was calm, cooperative, and appropriate.            Assessment/Plan:   1. Left submandibular gland swelling.  Patient is status post left submandibular sialendoscopy on 1/20/2020.  Doing well.  Some minimal edema of the [place] duct, however, this is expected and typical of " postop.  There is still a little bit of edema of the submandibular gland itself.  Patient will continue to do some warm compresses and massage.  She will follow-up if symptoms recur, on an as-needed basis.  Patient comfortable with plan        Mattie Umaña PA-C  Otolaryngology  Head & Neck Surgery  [phone]      CC:  Mark Kate MD  [facility] 852

## 2020-01-28 NOTE — LETTER
"1/28/2020     RE: Sruthi Escobar  [address] [address]     Dear Colleague,    Thank you for referring your patient, Sruthi Escobar, to the [facility] EAR NOSE AND THROAT at [facility]. Please see a copy of my visit note below.    [facility] Ear, Nose and Throat Clinic Follow Up Visit Note  Otolaryngology    January 28, 2020       HPI:  Sruthi Escobar is a 54 year old female who presents for a post-op follow up visit.  She had a left submandibular sialendoscopy on 1/20/2020 by Dr. Kate. Per the op note,  the left [place] duct clear down to the bifurcation. There was no obvious stricture and no stones identified.    Sruthi reports that she is doing well.  She had some swelling and some discomfort for about 3 days postoperatively.  All of that has gone away except for a little bit of swelling of the submandibular gland itself and the duct itself under the tongue is slightly swollen still.    She has not had any fevers or chills.  No drainage.  No debris from the duct.  She feels quite good today.    REVIEW OF SYSTEMS:  10 point ROS was negative other than the symptoms noted above in the HPI.    Physical Exam:  /80   Pulse 88   Temp 97.5  F (36.4  C)   Resp 18   Ht 1.626 m (5' 4\")   Wt 88 kg (194 lb)   BMI 33.30 kg/m       Constitutional: The patient was unaccompanied, well-groomed, and in no acute distress.    Mouth: Mucosa pink and moist, tonsils non-erythematous, no exudates, uvula midline, Ramakrishna duct slightly swollen, non-tender, no debris or pus expressed from duct  Neck: slight swelling of the left submandibular gland, non-tender. No lymphadenopathy in the neck.  No palpable thyroid.  Normal range of motion  Respiratory: Breathing comfortably without stridor or exertion of accessory muscles.   Skin: Normal:  warm and pink without rash  Neurologic: Alert and oriented x 3.  CN's III-XII within normal limits.  Voice normal.   Psychiatric: The " patient's affect was calm, cooperative, and appropriate.        Assessment/Plan:   1. Left submandibular gland swelling.  Patient is status post left submandibular sialendoscopy on 1/20/2020.  Doing well.  Some minimal edema of the [place] duct, however, this is expected and typical of postop.  There is still a little bit of edema of the submandibular gland itself.  Patient will continue to do some warm compresses and massage.  She will follow-up if symptoms recur, on an as-needed basis.  Patient comfortable with plan      Mattie Umaña PA-C  Otolaryngology  Head & Neck Surgery  [phone]      CC:  Mark Kate MD  [facility] [phone]

## 2020-02-17 ENCOUNTER — HEALTH MAINTENANCE LETTER (OUTPATIENT)
Age: 55
End: 2020-02-17

## 2020-04-01 ENCOUNTER — TELEPHONE (OUTPATIENT)
Dept: OTOLARYNGOLOGY | Facility: CLINIC | Age: 55
End: 2020-04-01

## 2020-04-01 NOTE — TELEPHONE ENCOUNTER
Pt called regarding upcoming appointment with Dr. Kate. Explained that in-light of everything occurring with COVID-19 we are limiting clinic visits to acutely urgent visits only.     Pt asked if she would be willing to complete a virtual video visit rather than come to clinic. She does not have video capability, but is interested in a telephone visit.     Pt rescheduled to telephone visit 4/7 at 0845.     Direct line given for additional questions/concerns.     Natice Schwab, RN BSN

## 2020-04-07 ENCOUNTER — VIRTUAL VISIT (OUTPATIENT)
Dept: OTOLARYNGOLOGY | Facility: CLINIC | Age: 55
End: 2020-04-07
Payer: COMMERCIAL

## 2020-04-07 DIAGNOSIS — R22.0 SUBMANDIBULAR SWELLING: Primary | ICD-10-CM

## 2020-04-07 DIAGNOSIS — R22.1 SUBMANDIBULAR SWELLING: Primary | ICD-10-CM

## 2020-04-07 NOTE — PATIENT INSTRUCTIONS
1. You were seen in the ENT Clinic today by Dr. Kate.  If you have any questions or concerns after your appointment, please call   - Option 1: ENT Clinic: [phone]  - Option 2: Dominga (Dr. Kate's Nurse): [phone]    2.   Plan to return to clinic as needed     Natice Schwab, RN  [facility] Otolaryngology  [phone]

## 2020-04-07 NOTE — PROGRESS NOTES
Pt called for video visit. No answer. Voicemail left with direct line for call-back to complete visit.     Natice Schwab, RN BSN

## 2020-04-29 NOTE — PROGRESS NOTES
This patient was called for a vitual visit on 4/7/2020/        They did not answer the call so this should be rescheduled and the chart should be administratively closed. This has not occurred yet- but the system is still new. I do not know how to do this.     Plan again for a virtual visual visit in may 2020.     Mark Kate MD

## 2020-05-05 ENCOUNTER — VIRTUAL VISIT (OUTPATIENT)
Dept: OTOLARYNGOLOGY | Facility: CLINIC | Age: 55
End: 2020-05-05
Payer: COMMERCIAL

## 2020-05-05 VITALS — BODY MASS INDEX: 30.39 KG/M2 | WEIGHT: 178 LBS | HEIGHT: 64 IN

## 2020-05-05 DIAGNOSIS — R22.1 SUBMANDIBULAR SWELLING: Primary | ICD-10-CM

## 2020-05-05 DIAGNOSIS — R22.0 SUBMANDIBULAR SWELLING: Primary | ICD-10-CM

## 2020-05-05 RX ORDER — CEPHALEXIN 500 MG/1
500 CAPSULE ORAL 3 TIMES DAILY
Qty: 30 CAPSULE | Refills: 0 | Status: SHIPPED | OUTPATIENT
Start: 2020-05-05 | End: 2020-05-15

## 2020-05-05 ASSESSMENT — PAIN SCALES - GENERAL: PAINLEVEL: SEVERE PAIN (6)

## 2020-05-05 ASSESSMENT — MIFFLIN-ST. JEOR: SCORE: 1382.65

## 2020-05-05 NOTE — PROGRESS NOTES
"Sruthi Escobar is a 54 year old female who is being evaluated via a billable video visit.      The patient has been notified of following:     \"This video visit will be conducted via a call between you and your physician/provider. We have found that certain health care needs can be provided without the need for an in-person physical exam.  This service lets us provide the care you need with a video conversation.  If a prescription is necessary we can send it directly to your pharmacy.  If lab work is needed we can place an order for that and you can then stop by our lab to have the test done at a later time.    Video visits are billed at different rates depending on your insurance coverage.  Please reach out to your insurance provider with any questions.    If during the course of the call the physician/provider feels a video visit is not appropriate, you will not be charged for this service.\"    Patient has given verbal consent for Video visit? Yes    How would you like to obtain your AVS? Mark    Patient would like the video invitation sent by: Send to e-mail at: [email or web address]    Will anyone else be joining your video visit? No         HISTORY OF PRESENT ILLNESS: Sruthi Escobar is a 54 year old female with a history of submax gland infection on the left siude. Did a sialoendoscopy for the past 4 months has been pretty good but new infection about last week.      No other compalints but left neckl with point tenderness in the gland area.   Last 2 Scores for Patient-Answered VHI Questionnaire  No flowsheet data found.    Last 2 Scores for Patient-Answered CSI Questionnaire  No flowsheet data found.      Last 2 Scores for Patient-Answered EAT Questionnaire  No flowsheet data found.        PAST MEDICAL HISTORY:   Past Medical History:   Diagnosis Date     Anemia      Bronchiectasis (H)      Chronic tonsillitis      Gastroesophageal reflux disease      Hypertension      Migraines      Pain in shoulder " region, left      Thyroid disease        PAST SURGICAL HISTORY:   Past Surgical History:   Procedure Laterality Date     BUNIONECTOMY Bilateral      C/SECTION, CLASSICAL      x2     ENDOSCOPIC REMOVAL SALIVARY GLAND STONE Left 1/20/2020    Procedure: Left Submandibular Sialendoscopy;  Surgeon: Mark Kate MD;  [facility]: [facility] OR     partial thyroidectomy         FAMILY HISTORY:   Family History   Problem Relation Age of Onset     Hypertension Mother      Uterine Cancer Mother      Heart Disease Father      Hypertension Father      Hypertension Maternal Grandmother      Heart Disease Maternal Grandfather      Anesthesia Reaction No family hx of      Deep Vein Thrombosis (DVT) No family hx of        SOCIAL HISTORY:   Social History     Tobacco Use     Smoking status: Never Smoker     Smokeless tobacco: Never Used   Substance Use Topics     Alcohol use: No       REVIEW OF SYSTEMS: Ten point review of systems was performed and is negative except for:   [facility] ENT ROS 12/3/2019   Constitutional Unexplained fatigue   Psychology Frequently feeling depressed or sad   Gastrointestinal/Genitourinary Heartburn/indigestion   Hematologic Lymph node swelling        ALLERGIES: Prochlorperazine and Phenothiazines    MEDICATIONS:   Current Outpatient Medications   Medication Sig Dispense Refill     acetaminophen (TYLENOL) 325 MG tablet Take 325-650 mg by mouth every 6 hours as needed for mild pain (PT last dose approx 2 weeks ago 1.15.2020)       albuterol (PROAIR HFA/PROVENTIL HFA/VENTOLIN HFA) 108 (90 Base) MCG/ACT Inhaler Inhale 2 puffs into the lungs every 6 hours (Patient taking differently: Inhale 2 puffs into the lungs as needed ) 1 Inhaler 3     calcium-vitamin D (CALTRATE) 600-400 MG-UNIT per tablet Take 1 tablet by mouth as needed (PT last dose a week ago 1.15.2020)        cephALEXin (KEFLEX) 500 MG capsule Take 1 capsule (500 mg) by mouth 3 times daily for 10 days 30 capsule 0     ferrous sulfate ([employer] IRON) 325 (65 Fe)  MG tablet Take 325 mg by mouth as needed (PT last dose a week ago 1.15.2020)        hydrochlorothiazide (MICROZIDE) 12.5 MG capsule Take 1 capsule (12.5 mg) by mouth daily (Patient taking differently: Take 12.5 mg by mouth every other day ) 90 capsule 3     lisinopril (PRINIVIL/ZESTRIL) 10 MG tablet Take 1 tablet (10 mg) by mouth daily (Patient taking differently: Take 10 mg by mouth every morning ) 90 tablet 3     Multiple Vitamin (MULTIVITAMIN  S) CAPS Take 1 capsule by mouth as needed (PT last dose a week ago 1.15.2020)        amoxicillin-clavulanate (AUGMENTIN) 875-125 MG tablet Take 1 tablet by mouth 2 times daily (Patient not taking: Reported on 5/5/2020) 6 tablet 0         PHYSICAL EXAMINATION:  She  is awake, alert and in no apparent distress.    Her tympanic membranes are clear and intact bilaterally. External auditory canals are clear.  Nasal exam shows a mild septal deviation without obstruction.  Examination of the oral cavity shows no suspicious lesions.  There is symmetric movement of the tongue and soft palate.    The oropharynx is clear.  Her neck is supple without significant adenopathy.  Pulse is regular.  Upper airway is clear.  Cranial nerves II-XII are grossly intact.        IMPRESSION/PLAN:      Will use keflex 500mg tid for 10 days and check for resolution by video call in about 4 weeks.               Video-Visit Details    Type of service:  Video Visit    Video Start Time: 205 Pm   Video End Time: 2:17 PM    Originating Location (pt. Location): Home    Distant Location (provider location):  [facility] EAR NOSE AND THROAT     Platform used for Video Visit: Arely Kate MD

## 2020-05-05 NOTE — PATIENT INSTRUCTIONS
1. You were seen in the ENT Clinic today by Dr. Kate.  If you have any questions or concerns after your appointment, please call   - Option 1: ENT Clinic: [phone]  - Option 2: Dominga (Dr. Kate's Nurse): [phone]    2.   Plan to return to clinic 6/2 at 0815    3. Antibiotics sent to your local pharmacy     Natice Schwab, RN  [facility]- Otolaryngology  [phone]

## 2020-05-15 ENCOUNTER — APPOINTMENT (OUTPATIENT)
Dept: GENERAL RADIOLOGY | Facility: CLINIC | Age: 55
End: 2020-05-15
Attending: EMERGENCY MEDICINE
Payer: COMMERCIAL

## 2020-05-15 ENCOUNTER — HOSPITAL ENCOUNTER (EMERGENCY)
Facility: CLINIC | Age: 55
Discharge: HOME | End: 2020-05-15
Attending: EMERGENCY MEDICINE | Admitting: EMERGENCY MEDICINE
Payer: COMMERCIAL

## 2020-05-15 ENCOUNTER — VIRTUAL VISIT (OUTPATIENT)
Dept: FAMILY MEDICINE | Facility: OTHER | Age: 55
End: 2020-05-15
Payer: COMMERCIAL

## 2020-05-15 VITALS
RESPIRATION RATE: 16 BRPM | TEMPERATURE: 98.3 F | DIASTOLIC BLOOD PRESSURE: 80 MMHG | HEART RATE: 98 BPM | OXYGEN SATURATION: 98 % | SYSTOLIC BLOOD PRESSURE: 130 MMHG

## 2020-05-15 DIAGNOSIS — R05.9 COUGH: ICD-10-CM

## 2020-05-15 LAB
ALBUMIN SERPL-MCNC: 3.3 G/DL (ref 3.4–5)
ALP SERPL-CCNC: 145 U/L (ref 40–150)
ALT SERPL W P-5'-P-CCNC: 28 U/L (ref 0–50)
ANION GAP SERPL CALCULATED.3IONS-SCNC: 5 MMOL/L (ref 3–14)
AST SERPL W P-5'-P-CCNC: 17 U/L (ref 0–45)
BASOPHILS # BLD AUTO: 0 10E9/L (ref 0–0.2)
BASOPHILS NFR BLD AUTO: 0.2 %
BILIRUB SERPL-MCNC: 0.5 MG/DL (ref 0.2–1.3)
BUN SERPL-MCNC: 14 MG/DL (ref 7–30)
CALCIUM SERPL-MCNC: 9.5 MG/DL (ref 8.5–10.1)
CHLORIDE SERPL-SCNC: 104 MMOL/L (ref 94–109)
CO2 SERPL-SCNC: 29 MMOL/L (ref 20–32)
CREAT SERPL-MCNC: 0.59 MG/DL (ref 0.52–1.04)
DIFFERENTIAL METHOD BLD: NORMAL
EOSINOPHIL # BLD AUTO: 0 10E9/L (ref 0–0.7)
EOSINOPHIL NFR BLD AUTO: 0.2 %
ERYTHROCYTE [DISTWIDTH] IN BLOOD BY AUTOMATED COUNT: 13.3 % (ref 10–15)
GFR SERPL CREATININE-BSD FRML MDRD: >90 ML/MIN/{1.73_M2}
GLUCOSE SERPL-MCNC: 107 MG/DL (ref 70–99)
HCT VFR BLD AUTO: 40.1 % (ref 35–47)
HGB BLD-MCNC: 12.9 G/DL (ref 11.7–15.7)
IMM GRANULOCYTES # BLD: 0 10E9/L (ref 0–0.4)
IMM GRANULOCYTES NFR BLD: 0.2 %
LYMPHOCYTES # BLD AUTO: 1.7 10E9/L (ref 0.8–5.3)
LYMPHOCYTES NFR BLD AUTO: 32 %
MCH RBC QN AUTO: 26.9 PG (ref 26.5–33)
MCHC RBC AUTO-ENTMCNC: 32.2 G/DL (ref 31.5–36.5)
MCV RBC AUTO: 84 FL (ref 78–100)
MONOCYTES # BLD AUTO: 0.5 10E9/L (ref 0–1.3)
MONOCYTES NFR BLD AUTO: 9.6 %
NEUTROPHILS # BLD AUTO: 3.1 10E9/L (ref 1.6–8.3)
NEUTROPHILS NFR BLD AUTO: 57.8 %
NRBC # BLD AUTO: 0 10*3/UL
NRBC BLD AUTO-RTO: 0 /100
PLATELET # BLD AUTO: 244 10E9/L (ref 150–450)
POTASSIUM SERPL-SCNC: 3.5 MMOL/L (ref 3.4–5.3)
PROT SERPL-MCNC: 7.7 G/DL (ref 6.8–8.8)
RBC # BLD AUTO: 4.8 10E12/L (ref 3.8–5.2)
SARS-COV-2 RNA SPEC QL NAA+PROBE: NOT DETECTED
SODIUM SERPL-SCNC: 138 MMOL/L (ref 133–144)
SPECIMEN SOURCE: NORMAL
TROPONIN I SERPL-MCNC: <0.015 UG/L (ref 0–0.04)
WBC # BLD AUTO: 5.4 10E9/L (ref 4–11)

## 2020-05-15 PROCEDURE — 99285 EMERGENCY DEPT VISIT HI MDM: CPT | Mod: 25 | Performed by: EMERGENCY MEDICINE

## 2020-05-15 PROCEDURE — 93005 ELECTROCARDIOGRAM TRACING: CPT | Performed by: EMERGENCY MEDICINE

## 2020-05-15 PROCEDURE — 99421 OL DIG E/M SVC 5-10 MIN: CPT | Performed by: PHYSICIAN ASSISTANT

## 2020-05-15 PROCEDURE — 84484 ASSAY OF TROPONIN QUANT: CPT | Performed by: EMERGENCY MEDICINE

## 2020-05-15 PROCEDURE — 93010 ELECTROCARDIOGRAM REPORT: CPT | Mod: Z6 | Performed by: EMERGENCY MEDICINE

## 2020-05-15 PROCEDURE — 87635 SARS-COV-2 COVID-19 AMP PRB: CPT | Performed by: EMERGENCY MEDICINE

## 2020-05-15 PROCEDURE — 85025 COMPLETE CBC W/AUTO DIFF WBC: CPT | Performed by: EMERGENCY MEDICINE

## 2020-05-15 PROCEDURE — 71045 X-RAY EXAM CHEST 1 VIEW: CPT

## 2020-05-15 PROCEDURE — 80053 COMPREHEN METABOLIC PANEL: CPT | Performed by: EMERGENCY MEDICINE

## 2020-05-15 RX ORDER — SODIUM CHLORIDE 9 MG/ML
1000 INJECTION, SOLUTION INTRAVENOUS CONTINUOUS
Status: DISCONTINUED | OUTPATIENT
Start: 2020-05-15 | End: 2020-05-15 | Stop reason: HOSPADM

## 2020-05-15 ASSESSMENT — ENCOUNTER SYMPTOMS
SHORTNESS OF BREATH: 1
COUGH: 1

## 2020-05-15 NOTE — ED TRIAGE NOTES
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 Patient was treated with keflex a week ago for bronchiectasis.

## 2020-05-15 NOTE — ED PROVIDER NOTES
[facility] [place] [place] EMERGENCY DEPARTMENT ([facility])     May 15, 2020  History     Chief Complaint   Patient presents with     Cough     dry cough that started a day ago     Pharyngitis     Shortness of Breath     claims brandt she can'r catch a breath     The history is provided by the patient and medical records.     Sruthi Escobar is a 54 year old female with a medical history significant for bronchiectasis, anemia, hypertension, migraines, thyroid disease, and GERD who presents to the ED today complaining of a cough and shortness of breath.  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  Patient was treated with Keflex a week ago for bronchiectasis.  Patient reports a cough and shortness of breath since yesterday.  She reportedly took her blood pressure medications today.  She is also experiencing some burning chest pain on her left side.  She denies any calf pain or history of DVT or prior PE..    I have reviewed the Medications, Allergies, Past Medical and Surgical History, and Social History in the [employer] system.  PAST MEDICAL HISTORY:   Past Medical History:   Diagnosis Date     Anemia      Bronchiectasis (H)      Chronic tonsillitis      Gastroesophageal reflux disease      Hypertension      Migraines      Pain in shoulder region, left      Thyroid disease        PAST SURGICAL HISTORY:   Past Surgical History:   Procedure Laterality Date     BUNIONECTOMY Bilateral      C/SECTION, CLASSICAL      x2     ENDOSCOPIC REMOVAL SALIVARY GLAND STONE Left 1/20/2020    Procedure: Left Submandibular Sialendoscopy;  Surgeon: Mark Kate MD;  Location: [facility] OR     partial thyroidectomy         Past medical history, past surgical history, medications, and allergies were reviewed with the patient. Additional pertinent items: None    FAMILY HISTORY:   Family History   Problem Relation Age of Onset     Hypertension Mother      Uterine Cancer Mother      Heart Disease Father       Hypertension Father      Hypertension Maternal Grandmother      Heart Disease Maternal Grandfather      Anesthesia Reaction No family hx of      Deep Vein Thrombosis (DVT) No family hx of        SOCIAL HISTORY:   Social History     Tobacco Use     Smoking status: Never Smoker     Smokeless tobacco: Never Used   Substance Use Topics     Alcohol use: No     Social history was reviewed with the patient. Additional pertinent items: None      Discharge Medication List as of 5/15/2020  2:01 PM      CONTINUE these medications which have NOT CHANGED    Details   acetaminophen (TYLENOL) 325 MG tablet Take 325-650 mg by mouth every 6 hours as needed for mild pain (PT last dose approx 2 weeks ago 1.15.2020), Historical      albuterol (PROAIR HFA/PROVENTIL HFA/VENTOLIN HFA) 108 (90 Base) MCG/ACT Inhaler Inhale 2 puffs into the lungs every 6 hours, Disp-1 Inhaler, R-3, E-Prescribe      amoxicillin-clavulanate (AUGMENTIN) 875-125 MG tablet Take 1 tablet by mouth 2 times daily, Disp-6 tablet, R-0, E-Prescribe      calcium-vitamin D (CALTRATE) 600-400 MG-UNIT per tablet Take 1 tablet by mouth as needed (PT last dose a week ago 1.15.2020) , Historical      cephALEXin (KEFLEX) 500 MG capsule Take 1 capsule (500 mg) by mouth 3 times daily for 10 days, Disp-30 capsule,R-0, E-Prescribe      ferrous sulfate ([employer] IRON) 325 (65 Fe) MG tablet Take 325 mg by mouth as needed (PT last dose a week ago 1.15.2020) , Historical      hydrochlorothiazide (MICROZIDE) 12.5 MG capsule Take 1 capsule (12.5 mg) by mouth daily, Disp-90 capsule, R-3, E-Prescribe      lisinopril (PRINIVIL/ZESTRIL) 10 MG tablet Take 1 tablet (10 mg) by mouth daily, Disp-90 tablet, R-3, E-Prescribe      Multiple Vitamin (MULTIVITAMIN  S) CAPS Take 1 capsule by mouth as needed (PT last dose a week ago 1.15.2020) , Historical                Allergies   Allergen Reactions     Prochlorperazine Anxiety and Itching     Phenothiazines Rash        Review of Systems   Respiratory:  Positive for cough and shortness of breath.    Cardiovascular: Positive for chest pain (left-sided; burning pain).   All other systems reviewed and are negative.    A complete review of systems was performed with pertinent positives and negatives noted in the HPI, and all other systems negative.       Physical Exam   BP: (!) 132/107  Pulse: 100  Temp: 98.3  F (36.8  C)  Resp: 16  SpO2: 100 % 11:02 AM  The patient was seen and examined by Brijesh Penaloza DO in Room ED03.         Physical Exam  Constitutional:       General: She is not in acute distress.     Appearance: She is not diaphoretic.   HENT:      Head: Normocephalic.      Mouth/Throat:      Pharynx: No oropharyngeal exudate.   Eyes:      Extraocular Movements: Extraocular movements intact.   Neck:      Musculoskeletal: Neck supple.   Cardiovascular:      Rate and Rhythm: Normal rate and regular rhythm.      Heart sounds: Normal heart sounds.   Pulmonary:      Effort: No respiratory distress.      Breath sounds: Normal breath sounds.   Abdominal:      General: There is no distension.      Palpations: Abdomen is soft.      Tenderness: There is no abdominal tenderness.   Musculoskeletal:         General: No deformity.   Skin:     General: Skin is warm and dry.   Neurological:      Mental Status: She is alert.      Comments: alert   Psychiatric:         Behavior: Behavior normal.         ED Course   10:52 AM  The patient was seen and examined by Brijesh Penaloza Do in Room ED03.        Procedures             EKG Interpretation:      Interpreted by Brijesh Penaloza DO  Time reviewed: 1030    Symptoms at time of EKG: Dyspnea  Rhythm: normal sinus   Rate: normal  Axis: normal  Ectopy: none  Conduction: normal  ST Segments/ T Waves: No ST-T wave changes  Q Waves: none  Comparison to prior: Unchanged    Clinical Impression: normal EKG           Results for orders placed or performed during the hospital encounter of 05/15/20 (from the past 24 hour(s))   CBC  with platelets differential   Result Value Ref Range    WBC 5.4 4.0 - 11.0 10e9/L    RBC Count 4.80 3.8 - 5.2 10e12/L    Hemoglobin 12.9 11.7 - 15.7 g/dL    Hematocrit 40.1 35.0 - 47.0 %    MCV 84 78 - 100 fl    MCH 26.9 26.5 - 33.0 pg    MCHC 32.2 31.5 - 36.5 g/dL    RDW 13.3 10.0 - 15.0 %    Platelet Count 244 150 - 450 10e9/L    Diff Method Automated Method     % Neutrophils 57.8 %    % Lymphocytes 32.0 %    % Monocytes 9.6 %    % Eosinophils 0.2 %    % Basophils 0.2 %    % Immature Granulocytes 0.2 %    Nucleated RBCs 0 0 /100    Absolute Neutrophil 3.1 1.6 - 8.3 10e9/L    Absolute Lymphocytes 1.7 0.8 - 5.3 10e9/L    Absolute Monocytes 0.5 0.0 - 1.3 10e9/L    Absolute Eosinophils 0.0 0.0 - 0.7 10e9/L    Absolute Basophils 0.0 0.0 - 0.2 10e9/L    Abs Immature Granulocytes 0.0 0 - 0.4 10e9/L    Absolute Nucleated RBC 0.0    Troponin I   Result Value Ref Range    Troponin I ES <0.015 0.000 - 0.045 ug/L   Comprehensive metabolic panel   Result Value Ref Range    Sodium 138 133 - 144 mmol/L    Potassium 3.5 3.4 - 5.3 mmol/L    Chloride 104 94 - 109 mmol/L    Carbon Dioxide 29 20 - 32 mmol/L    Anion Gap 5 3 - 14 mmol/L    Glucose 107 (H) 70 - 99 mg/dL    Urea Nitrogen 14 7 - 30 mg/dL    Creatinine 0.59 0.52 - 1.04 mg/dL    GFR Estimate >90 >60 mL/min/[1.73_m2]    GFR Estimate If Black >90 >60 mL/min/[1.73_m2]    Calcium 9.5 8.5 - 10.1 mg/dL    Bilirubin Total 0.5 0.2 - 1.3 mg/dL    Albumin 3.3 (L) 3.4 - 5.0 g/dL    Protein Total 7.7 6.8 - 8.8 g/dL    Alkaline Phosphatase 145 40 - 150 U/L    ALT 28 0 - 50 U/L    AST 17 0 - 45 U/L   XR Chest Port 1 View    Narrative    CHEST ONE VIEW May 15, 2020 12:26 PM     HISTORY: Dyspnea.    COMPARISON: None.      Impression    IMPRESSION: No acute disease.    CAN HERNANDEZ MD     Medications   0.9% sodium chloride BOLUS (1,000 mLs Intravenous Not Given 5/15/20 1414)     Followed by   sodium chloride 0.9% infusion (1,000 mLs Intravenous Not Given 5/15/20 1415)      Results for  orders placed or performed during the hospital encounter of 05/15/20   XR Chest Port 1 View     Status: None    Narrative    CHEST ONE VIEW May 15, 2020 12:26 PM     HISTORY: Dyspnea.    COMPARISON: None.      Impression    IMPRESSION: No acute disease.    CAN HERNANDEZ MD   CBC with platelets differential     Status: None   Result Value Ref Range    WBC 5.4 4.0 - 11.0 10e9/L    RBC Count 4.80 3.8 - 5.2 10e12/L    Hemoglobin 12.9 11.7 - 15.7 g/dL    Hematocrit 40.1 35.0 - 47.0 %    MCV 84 78 - 100 fl    MCH 26.9 26.5 - 33.0 pg    MCHC 32.2 31.5 - 36.5 g/dL    RDW 13.3 10.0 - 15.0 %    Platelet Count 244 150 - 450 10e9/L    Diff Method Automated Method     % Neutrophils 57.8 %    % Lymphocytes 32.0 %    % Monocytes 9.6 %    % Eosinophils 0.2 %    % Basophils 0.2 %    % Immature Granulocytes 0.2 %    Nucleated RBCs 0 0 /100    Absolute Neutrophil 3.1 1.6 - 8.3 10e9/L    Absolute Lymphocytes 1.7 0.8 - 5.3 10e9/L    Absolute Monocytes 0.5 0.0 - 1.3 10e9/L    Absolute Eosinophils 0.0 0.0 - 0.7 10e9/L    Absolute Basophils 0.0 0.0 - 0.2 10e9/L    Abs Immature Granulocytes 0.0 0 - 0.4 10e9/L    Absolute Nucleated RBC 0.0    Troponin I     Status: None   Result Value Ref Range    Troponin I ES <0.015 0.000 - 0.045 ug/L   Comprehensive metabolic panel     Status: Abnormal   Result Value Ref Range    Sodium 138 133 - 144 mmol/L    Potassium 3.5 3.4 - 5.3 mmol/L    Chloride 104 94 - 109 mmol/L    Carbon Dioxide 29 20 - 32 mmol/L    Anion Gap 5 3 - 14 mmol/L    Glucose 107 (H) 70 - 99 mg/dL    Urea Nitrogen 14 7 - 30 mg/dL    Creatinine 0.59 0.52 - 1.04 mg/dL    GFR Estimate >90 >60 mL/min/[1.73_m2]    GFR Estimate If Black >90 >60 mL/min/[1.73_m2]    Calcium 9.5 8.5 - 10.1 mg/dL    Bilirubin Total 0.5 0.2 - 1.3 mg/dL    Albumin 3.3 (L) 3.4 - 5.0 g/dL    Protein Total 7.7 6.8 - 8.8 g/dL    Alkaline Phosphatase 145 40 - 150 U/L    ALT 28 0 - 50 U/L    AST 17 0 - 45 U/L            Assessments & Plan (with Medical Decision Making)    54-year-old female presents to us with a chief complaint of cough and shortness of breath.  Differential includes but not limited to COVID-19, pneumonia, bronchitis, dysrhythmia, acute coronary syndrome.  Vital signs show hypertension but she is afebrile with a normal pulse ox at this time.  EKG is unremarkable.  Chest x-ray was clear and labs are otherwise unremarkable.  Patient does have a COVID-19 test pending and she was informed she can get the results on my chart or she will be contacted if positive likely tomorrow.  Patient is comfortable with discharge home.  She understands to self isolate until she knows the results of her COVID-19 test.    I have reviewed the nursing notes.    I have reviewed the findings, diagnosis, plan and need for follow up with the patient.    Discharge Medication List as of 5/15/2020  2:01 PM          Final diagnoses:   Cough   IKyaw am serving as a trained medical scribe to document services personally performed by Brijesh Penaloza DO, based on the provider's statements to me.     Brijesh FINLEY DO, was physically present and have reviewed and verified the accuracy of this note documented by Kyaw Thayer.      5/15/2020   [facility], [place], EMERGENCY DEPARTMENT     Brijesh Penaloza DO  05/15/20 [id number]

## 2020-05-15 NOTE — ED AVS SNAPSHOT
[facility], [place], Emergency Department  [address]  [facility] [address]  Phone:  [phone]  Fax:  [phone]                                    Sruthi Escobar   MRN: [id number]    Department:  [facility], Emergency Department   Date of Visit:  5/15/2020           After Visit Summary Signature Page    I have received my discharge instructions, and my questions have been answered. I have discussed any challenges I see with this plan with the nurse or doctor.    ..........................................................................................................................................  Patient/Patient Representative Signature      ..........................................................................................................................................  Patient Representative Print Name and Relationship to Patient    ..................................................               ................................................  Date                                   Time    ..........................................................................................................................................  Reviewed by Signature/Title    ...................................................              ..............................................  Date                                               Time          22EPIC Rev 08/18

## 2020-05-15 NOTE — PROGRESS NOTES
"Date: 05/15/2020 09:38:37  Clinician: Veena Baez  Clinician NPI: [id number]  Patient: Sruthi Baumann  Patient : 1965  Patient Address: [address] [place] [address]  Patient Phone: [phone]  Visit Protocol: URI  Patient Summary:  Sruthi is a 54 year old ( : 1965 ) female who initiated a Visit for COVID-19 (Coronavirus) evaluation and screening. When asked the question \"Please sign me up to receive news, health information and promotions from [facility].\", Sruthi responded \"Yes\".    Sruthi states her symptoms started 1-2 days ago.   Her symptoms consist of a cough, nasal congestion, nausea, chills, malaise, myalgia, rhinitis, a sore throat, and enlarged lymph nodes. She is experiencing mild difficulty breathing with activities but can speak normally in full sentences. Sruthi also feels feverish but was unable to measure her temperature.   Symptom details     Nasal secretions: The color of her mucus is yellow.    Cough: Sruthi coughs every 5-10 minutes and her cough is more bothersome at night. Phlegm comes into her throat when she coughs. She does not believe her cough is caused by post-nasal drip. The color of the phlegm is green and yellow.     Sore throat: Sruthi reports having moderate throat pain (4-6 on a 10 point pain scale), does not have exudate on her tonsils, and can swallow liquids. The lymph nodes in her neck are enlarged. A rash has not appeared on the skin since the sore throat started.      Sruthi denies having vomiting, teeth pain, ageusia, diarrhea, facial pain or pressure, ear pain, headache, anosmia, and wheezing. She also denies having a sinus infection within the past year and having recent facial or sinus surgery in the past 60 days.   Precipitating events  Sruthi is not sure if she has been exposed to someone with strep throat. She has recently been exposed to someone with influenza. Sruthi has been in close contact with the following high risk individuals: people " with asthma, heart disease or diabetes.   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   She either works or volunteers as a healthcare worker or a , or works or volunteers in a healthcare facility. She provides direct patient care. 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 She lives with a healthcare worker.   Sruthi has had a close contact with a laboratory-confirmed COVID-19 patient within 14 days of symptom onset. She was exposed at her work. Additional information about contact with COVID-19 (Coronavirus) patient as reported by the patient (free text): I am  in direct contact with covid patient. some  of the patient that I have screen for COVID  test came back positive. including one day ago   Pertinent medical history  Sruthi has taken an antibiotic medication in the past month. Antibiotic details as reported by the patient (free text): Keflex, started last week for infected submendibular gland. 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 Please I need to be tested. started dry cough last night and started experiencing SOB  last night and this morning.   Sruthi does not get yeast infections when she takes antibiotics.   Sruthi needs a return to work/school note.   Weight: 170 lbs   Sruthi does not smoke or use smokeless tobacco.   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 This illness can cause fever, cough and trouble breathing. Many  "people get a mild case and get better on their own. Some people can get very sick.  What should I do?  We would like to test you for this virus. This will be a curbside test done outside the clinic.  Please call [phone] to schedule your visit. Explain that you were referred by [facility] to have a COVID-19 test. Be ready to share your [facility] visit ID number.  Starting now:  Stay at least 6 feet away from others. (If someone will drive you to your test, stay in the backseat, as far away from the  as you can.)   Don't go to work, school or anywhere else. When it's time for your test, go straight to the testing site. Don't make any stops on the way there or back.   Wash your hands and face often. Use soap and water.   Cover your mouth and nose with a mask, tissue or washcloth.   Don't touch anyone. No hugging, kissing or handshakes.  While at home   Stay home and away from others (self-isolate) until:  You've had no fever---and no medicine that reduces fever---for 3 full days (72 hours). And...  Your other symptoms have gotten better. For example, your cough or breathing has improved. And...  At least 10 days have passed since your symptoms started.  During this time:  Stay in your own room (and use your own bathroom), if you can.  Don't go to work, school or anywhere else.  Stay away from others in your home. No hugging, kissing or shaking hands.  Don't let anyone visit.  Cover your mouth and nose with a mask, tissue or washcloth to avoid spreading germs.  Clean \"high touch\" surfaces often (doorknobs, counters, handles, etc.). Use a household cleaning spray or wipes.  Wash your hands and face often. Use soap and water.  How can I take care of myself?  1. Get lots of rest. Drink extra fluids (unless your doctor has told you not to).  2. Take Tylenol (acetaminophen) for fever or pain. If you have liver or kidney problems, ask your family doctor if it's okay to take Tylenol.  Adults can take either:   650 mg (two " 325 mg pills) every 4 to 6 hours, or...  1,000 mg (two 500 mg pills) every 8 hours as needed.   Note: Don't take more than 3,000 mg in one day.   Acetaminophen is found in many medicines (both prescribed and over-the-counter medicines). Read all labels to be sure you don't take too much.   For children, check the Tylenol bottle for the right dose. The dose is based on the child's age or weight.  3. If you have other health problems (like cancer, heart failure, an organ transplant or severe kidney disease): Call your specialty clinic if you don't feel better in the next 2 days.  4. Know when to call 911: If your breathing is so bad that it keeps you from doing normal activities, call 911 or go to the emergency room. Tell them that you've been staying home and may have COVID-19.  5. Sign up for [employer]. We know it's scary to hear that you might have COVID-19. We want to track your symptoms to make sure you're okay over the next 2 weeks. Please look for an email from [employer]---this is a free, online program that we'll use to keep in touch. To sign up, follow the link in the email. Learn more at [email or web address].  6. The following will serve as your written order for this Covid Test ordered by me for the indication of suspected Covid [Z20.828]: The test will be ordered in [employer], our electronic health record after you are scheduled and will show as ordered and authorized by Emil Lara MD   Order: Covid-19 (Coronavirus) PCR for SYMPTOMATIC testing from [facility]  Where can I get more information?  To learn more about COVID-19 and how to care for yourself at home, please visit the CDC website at https://www.cdc.gov/coronavirus/2019-ncov/about/steps-when-sick.html.  For more about your care at [facility], please visit [email or web address]  If you'd like to be part of a COVID-19 clinical trial (research study) at the [facility], go to  [email or web address] for details.    Diagnosis: Acute upper respiratory infection, unspecified  Diagnosis ICD: J06.9

## 2020-05-15 NOTE — DISCHARGE INSTRUCTIONS
Please follow-up with your primary care provider.  If you feel like you are getting worse or have any other concerns please return to the emergency department.    TODAY'S VISIT  YOU ARE BEING TESTED FOR COVID-19 (NOVEL CORONAVIRUS).   -  The cause of your symptoms is not yet known, but you are being tested for COVID-19.  -  It has been determined that you do not medically need to be hospitalized at this time, and  you can be monitored with self-isolation at home.     YOUR TESTS FOR THIS ILLNESS ARE CURRENTLY IN PROCESS.    -  These tests are performed by the [place] Department of Health.  -  Our staff will contact you with your results, likely within the next 24-72 hours.  -  If your test is positive, you will also be contacted by the [place] Department of [facility].   -  Please remain under home isolation precautions until your healthcare provider and/or state and local health department tell you it is safe, and you no longer need to self-isolate at home.     SELF-ISOLATION INSTRUCTIONS  STAY HOME. Do not go to work, school, or public areas. Avoid using public transportation, ride-sharing (Uber/Lyft), or taxis. You should only leave your home if you require medical attention (See instructions below, please contact your provider first.)   SEPARATE YOURSELF FROM OTHER PEOPLE AND ANIMALS. As much as possible, you should stay in a specific room and away from other people in your home. You should use a separate bathroom, if available. Avoid contact with pets and other animals. When possible, have another household member care for your  family and animals while you are sick.   DO NOT SHARE HOUSEHOLD ITEMS. Do not share dishes, drinking glasses, eating utensils, towels, bedding, etc., with others or pets in your home. These items should be washed with soap and water.   WEAR A FACEMASK. Wear a facemask if you need to be around other people, and cover your mouth and nose with tissue when you cough or sneeze.  WASH YOUR  HANDS OFTEN. Wash your hands with soap and water for at least 20 second, or use hand  regularly. Avoid touching your face with unwashed hands.     SELF-MONITORING INSTRUCTIONS  SEEK PROMPT MEDICAL ATTENTION IF YOUR ILLNESS IS WORSENING. Watch closely for new or worsening symptoms, such as fever, cough, shortness of breath or difficulty breathing.   SIGN UP FOR [employer]. Sign up for the [employer] application, using the information at the end of this document. Your results and a message about those results can be sent through [employer]. If you do not have MyChart, we will call you with your results, but it may take longer.  IF YOU NEED MEDICAL CARE OR HAVE A MEDICAL APPOINTMENT (and it is not an emergency):   -  CALL YOUR HEALTHCARE PROVIDER BEFORE GOING TO THE [facility]  -  TELL THEM THAT YOU ARE BEING TESTING FOR AND MAY HAVE COVID-19.  YOUR CLOSE CONTACTS SHOULD MONITOR THEIR HEALTH. If your close contacts develop symptoms, they should visit [email or web address] to determine if testing is needed, and where this is done.   If you have any questions, please contact your usual health care provider or the [facility] ([facility]) at [phone]    EMERGENCY INSTRUCTIONS  IF YOU NEED EMERGENCY MEDICAL ATTENTION, CALL 911 AND LET THEM KNOW YOU MAY HAVE ARE BEING EVALUATED FOR COVID-19.      WHAT IS COVID-19 (CORONAVIRUS)  COVID-19 is a viral respiratory illness caused by a newly identified coronavirus that was discovered in late 2019 in [place]. Coronaviruses are a large family of viruses. Some coronaviruses cause illnesses in people and others only circulate among animals. Rarely, animal coronaviruses can evolve and infect people. The virus causing COVID-19 may have emerged from an animal source, and it is now able to spread from person to person.     How does it spread?   The virus is thought to spread between people who are in close contact (within about six feet) through respiratory droplets produced  when an infected person coughs, sneezes, or talks. It may also spread when one person touches a surface or object that has the virus on it and then touches their mouth, nose, or eyes. However, this is not thought to be the main way the virus is transmitted.    SYMPTOMS  This coronavirus causes mild to severe respiratory illness with often with fever, cough, and/or difficulty breathing. After exposure, symptoms typically present within 2 to 14 days.     TREATMENTS AND PREVENTION  Patients may also be asked to self-quarantine at home in order to prevent the spread of the coronavirus. There is no antiviral treatment recommended for COVID-19. People with COVID-19 may receive supportive care to help relieve symptoms.    Prevention  No vaccine is currently available for the coronavirus causing COVID-19. The best way to prevent spread of the illness is to avoid exposure through simple precautions. Prevention steps include:   Stay home if you're feeling sick.   Avoid close contact with others, and try to stay at least 6-feet away from others if you're ill.   Wash your hands often with soap and warm water for at least 20 seconds, especially after going to the bathroom; before eating; and after blowing your nose, coughing, or sneezing. Use an alcohol-based hand  with at least 60 percent alcohol if soap and water are not readily available.   Clean and disinfect frequently touched objects and surfaces using a regular household cleaning spray or wipe.     Thank you for your understanding and cooperation.

## 2020-05-17 LAB — INTERPRETATION ECG - MUSE: NORMAL

## 2020-06-02 ENCOUNTER — TELEPHONE (OUTPATIENT)
Dept: OTOLARYNGOLOGY | Facility: CLINIC | Age: 55
End: 2020-06-02

## 2020-06-02 ENCOUNTER — VIRTUAL VISIT (OUTPATIENT)
Dept: OTOLARYNGOLOGY | Facility: CLINIC | Age: 55
End: 2020-06-02
Payer: COMMERCIAL

## 2020-06-02 VITALS — BODY MASS INDEX: 29.88 KG/M2 | HEIGHT: 64 IN | WEIGHT: 175 LBS

## 2020-06-02 DIAGNOSIS — R22.0 SUBMANDIBULAR SWELLING: Primary | ICD-10-CM

## 2020-06-02 DIAGNOSIS — R22.1 SUBMANDIBULAR SWELLING: Primary | ICD-10-CM

## 2020-06-02 ASSESSMENT — PAIN SCALES - GENERAL: PAINLEVEL: MILD PAIN (3)

## 2020-06-02 ASSESSMENT — MIFFLIN-ST. JEOR: SCORE: 1368.79

## 2020-06-02 NOTE — LETTER
"6/2/2020       RE: Sruthi Escobar  [address] [facility] [address]     Dear Colleague,    Thank you for referring your patient, Sruthi Escobar, to the [facility] EAR NOSE AND THROAT at [facility]. Please see a copy of my visit note below.    Sruthi Escobar is a 54 year old female who is being evaluated via a billable video visit.      The patient has been notified of following:     \"This video visit will be conducted via a call between you and your physician/provider. We have found that certain health care needs can be provided without the need for an in-person physical exam.  This service lets us provide the care you need with a video conversation.  If a prescription is necessary we can send it directly to your pharmacy.  If lab work is needed we can place an order for that and you can then stop by our lab to have the test done at a later time.    Video visits are billed at different rates depending on your insurance coverage.  Please reach out to your insurance provider with any questions.    If during the course of the call the physician/provider feels a video visit is not appropriate, you will not be charged for this service.\"    Patient has given verbal consent for Video visit? yes    How would you like to obtain your AVS?   My chart    Patient would like the video invitation sent by: Send to e-mail at: [email or web address]    Will anyone else be joining your video visit? No        Video-Visit Details    Type of service:  Video Visit     Originating Location (pt. Location): Home    Distant Location (provider location):  [facility] EAR NOSE AND THROAT     Platform used for Video Visit: Roxy     We met by video to query on the status of her infection. This was indeed resolved at this point and we will follow up in about 4-6 months     10 - 15  minutes time for call, chart prep, and documentation.       Mark Kate MD          Again, thank you for allowing me " to participate in the care of your patient.      Sincerely,    Mark Kate MD

## 2020-06-02 NOTE — PROGRESS NOTES
"Sruthi Escobar is a 54 year old female who is being evaluated via a billable video visit.      The patient has been notified of following:     \"This video visit will be conducted via a call between you and your physician/provider. We have found that certain health care needs can be provided without the need for an in-person physical exam.  This service lets us provide the care you need with a video conversation.  If a prescription is necessary we can send it directly to your pharmacy.  If lab work is needed we can place an order for that and you can then stop by our lab to have the test done at a later time.    Video visits are billed at different rates depending on your insurance coverage.  Please reach out to your insurance provider with any questions.    If during the course of the call the physician/provider feels a video visit is not appropriate, you will not be charged for this service.\"    Patient has given verbal consent for Video visit? yes    How would you like to obtain your AVS?   My chart    Patient would like the video invitation sent by: Send to e-mail at: [email or web address]    Will anyone else be joining your video visit? No        Video-Visit Details    Type of service:  Video Visit     Originating Location (pt. Location): Home    Distant Location (provider location):  [facility] EAR NOSE AND THROAT     Platform used for Video Visit: Roxy     We met by video to query on the status of her infection. This was indeed resolved at this point and we will follow up in about 4-6 months     10 - 15  minutes time for call, chart prep, and documentation.       Mark Kate MD        "

## 2020-06-02 NOTE — PATIENT INSTRUCTIONS
1. You were seen in the ENT Clinic today by Dr. Kate.  If you have any questions or concerns after your appointment, please call   - Option 1: ENT Clinic: [phone]  - Option 2: [facility] (Dr. Kate's Nurse): [phone]    2.   Plan to return to clinic following your ultrasound.     Natice Schwab, RN  [facility] Otolaryngology  [phone]

## 2020-06-02 NOTE — TELEPHONE ENCOUNTER
Attempted to contact patient to schedule P RETURN appointment with Dr. Kate, with an Ultrasound prior.    Patient's VM was full; will send Prepared Response message with instructions.

## 2020-06-05 ENCOUNTER — TELEPHONE (OUTPATIENT)
Dept: OTOLARYNGOLOGY | Facility: CLINIC | Age: 55
End: 2020-06-05

## 2020-06-05 NOTE — TELEPHONE ENCOUNTER
Spoke with patient regarding scheduling ultrasound per Dr. Kate. Writer scheduled Ultrasound on 6/26/2020 at 0930. She acknowledged time and date. Writer also scheduled patient for follow up telephone visit 6/30/2020 at 1330. Patient also acknowledged time and date.     Estelle Barfield LPN

## 2020-06-26 ENCOUNTER — ANCILLARY PROCEDURE (OUTPATIENT)
Dept: ULTRASOUND IMAGING | Facility: CLINIC | Age: 55
End: 2020-06-26
Attending: OTOLARYNGOLOGY
Payer: COMMERCIAL

## 2020-06-26 DIAGNOSIS — R22.1 SUBMANDIBULAR SWELLING: ICD-10-CM

## 2020-06-26 DIAGNOSIS — R22.0 SUBMANDIBULAR SWELLING: ICD-10-CM

## 2020-06-30 ENCOUNTER — VIRTUAL VISIT (OUTPATIENT)
Dept: OTOLARYNGOLOGY | Facility: CLINIC | Age: 55
End: 2020-06-30
Payer: COMMERCIAL

## 2020-06-30 VITALS — BODY MASS INDEX: 30.32 KG/M2 | WEIGHT: 182 LBS | HEIGHT: 65 IN

## 2020-06-30 DIAGNOSIS — R22.0 SUBMANDIBULAR SWELLING: Primary | ICD-10-CM

## 2020-06-30 DIAGNOSIS — R22.1 SUBMANDIBULAR SWELLING: Primary | ICD-10-CM

## 2020-06-30 ASSESSMENT — PAIN SCALES - GENERAL: PAINLEVEL: NO PAIN (0)

## 2020-06-30 ASSESSMENT — MIFFLIN-ST. JEOR: SCORE: 1416.43

## 2020-06-30 NOTE — PROGRESS NOTES
"Sruthi Escobar is a 54 year old female who is being evaluated via a billable telephone visit.      The patient has been notified of following:     \"This telephone visit will be conducted via a call between you and your physician/provider. We have found that certain health care needs can be provided without the need for a physical exam.  This service lets us provide the care you need with a short phone conversation.  If a prescription is necessary we can send it directly to your pharmacy.  If lab work is needed we can place an order for that and you can then stop by our lab to have the test done at a later time.    Telephone visits are billed at different rates depending on your insurance coverage. During this emergency period, for some insurers they may be billed the same as an in-person visit.  Please reach out to your insurance provider with any questions.    If during the course of the call the physician/provider feels a telephone visit is not appropriate, you will not be charged for this service.\"    Patient has given verbal consent for Telephone visit?  Yes    What phone number would you like to be contacted at? [phone]    How would you like to obtain your AVS? [facility]    Phone call duration: 10 minutes  Sruthi Escobar is here for follow-up today.  This is a phone visit.      REASON FOR VISIT:  Submandibular swelling.  She has had sialadenitis.  She also had issues with a tonsilliths.  This is a lot better, but  not completely normal since we did testing on her and cleaned out her glands with a sialoendoscopy.  She does have dry mouth.  We are not done typical Sjogrens' labs or anything else of this nature.  We talked about the fact that it is intermittent.  The last time she had an infection, it took a couple courses of antibiotics.  She probably got better on Augmentin.  She has no other current complaints at the present time today.  She says she is not swollen now.       ASSESSMENT/PLAN:  We talked " about the best course of action.  We will get autoimmune labs that include an RISA as well as RO and LA.  We will have her follow up with us again probably in person.  When she has swelling again, she will call he clinic for a visit. We will get her in for either a video visit or an in-person visit within a week of her calling because the swelling seems to last that long.      We had 10 minutes of phone call time today.       Mark Kate MD

## 2020-06-30 NOTE — LETTER
6/30/2020       RE: Sruthi Escobar  [address]  [facility] [address]     Dear Colleague,    Thank you for referring your patient, Sruthi Escobar, to the [facility] EAR NOSE AND THROAT at [facility]. Please see a copy of my visit note below.    Sruthi Escobar is a 54 year old female who is being evaluated via a billable telephone visit.        Phone call duration: 10 minutes  Sruthi Escobar is here for follow-up today.  This is a phone visit.      REASON FOR VISIT:  Submandibular swelling.  She has had sialadenitis.  She also had issues with a tonsilliths.  This is a lot better, but  not completely normal since we did testing on her and cleaned out her glands with a sialoendoscopy.  She does have dry mouth.  We are not done typical Sjogrens' labs or anything else of this nature.  We talked about the fact that it is intermittent.  The last time she had an infection, it took a couple courses of antibiotics.  She probably got better on Augmentin.  She has no other current complaints at the present time today.  She says she is not swollen now.       ASSESSMENT/PLAN:  We talked about the best course of action.  We will get autoimmune labs that include an RISA as well as RO and LA.  We will have her follow up with us again probably in person.  When she has swelling again, she will call he clinic for a visit. We will get her in for either a video visit or an in-person visit within a week of her calling because the swelling seems to last that long.      We had 10 minutes of phone call time today.       Mark Kate MD

## 2020-06-30 NOTE — PATIENT INSTRUCTIONS
1. You were seen in the ENT Clinic today by Dr. Kate.  If you have any questions or concerns after your appointment, please call   -  ENT Clinic: [phone]      2.   Please call us if you start to have increased swelling. Please come to clinic for your labs.    Estelle Barfield LPN  [facility]  [phone]

## 2020-07-09 DIAGNOSIS — R22.0 SUBMANDIBULAR SWELLING: ICD-10-CM

## 2020-07-09 DIAGNOSIS — R22.1 SUBMANDIBULAR SWELLING: ICD-10-CM

## 2020-07-09 LAB — ERYTHROCYTE [SEDIMENTATION RATE] IN BLOOD BY WESTERGREN METHOD: 20 MM/H (ref 0–30)

## 2020-07-10 LAB
ANA PAT SER IF-IMP: ABNORMAL
ANA SER QL IF: POSITIVE
ANA TITR SER IF: ABNORMAL {TITER}
ENA SS-A IGG SER IA-ACNC: <0.2 AI (ref 0–0.9)
ENA SS-B IGG SER IA-ACNC: <0.2 AI (ref 0–0.9)

## 2020-07-14 ENCOUNTER — TELEPHONE (OUTPATIENT)
Dept: OTOLARYNGOLOGY | Facility: CLINIC | Age: 55
End: 2020-07-14

## 2020-07-14 DIAGNOSIS — R22.1 SUBMANDIBULAR SWELLING: Primary | ICD-10-CM

## 2020-07-14 DIAGNOSIS — R22.0 SUBMANDIBULAR SWELLING: Primary | ICD-10-CM

## 2020-07-15 ENCOUNTER — TELEPHONE (OUTPATIENT)
Dept: OTOLARYNGOLOGY | Facility: CLINIC | Age: 55
End: 2020-07-15

## 2020-07-15 NOTE — TELEPHONE ENCOUNTER
Spoke to patient regarding recent labs. Writer also reviewed these with Dr. Kate. Per Dr. Kate for patient to see rheumatology. Patient acknowledged. Writer stated that she would be contacted regarding scheduling. She acknowledged.  Writer encouraged patient to call with any changes to swelling (to face)    Estelle Barfield LPN

## 2020-07-20 ENCOUNTER — TELEPHONE (OUTPATIENT)
Dept: RHEUMATOLOGY | Facility: CLINIC | Age: 55
End: 2020-07-20

## 2020-07-20 NOTE — TELEPHONE ENCOUNTER
Patient is referred by Mark Kate for submandibular swelling.    Patient complains of: dry mouth    Per referring upon exam:    NA    Has patient had imaging that pertains to referral? No    Has patient had labs that pertains to referral reason: Yes    Component      Latest Ref Rng & Units 7/9/2020   RISA interpretation      NEG:Negative Positive (A)   RISA pattern 1       NUCLEAR DOTS   RISA titer 1       1:160   Sed Rate      0 - 30 mm/h 20   SSB (La) (CÉSAR) Antibody, IgG      0.0 - 0.9 AI <0.2   SSA (Ro) (CÉSAR) Antibody, IgG      0.0 - 0.9 AI <0.2       Sending for provider review to evaluate positive RISA.    Nguyen Hercules CMA   7/20/2020 2:03 PM

## 2020-07-30 NOTE — TELEPHONE ENCOUNTER
Ok to schedule patient in fellows clinic.     Scheduling request sent to schedulers.     Nguyen Hercules CMA   7/30/2020 2:37 PM
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
## 2020-08-19 ENCOUNTER — VIRTUAL VISIT (OUTPATIENT)
Dept: RHEUMATOLOGY | Facility: CLINIC | Age: 55
End: 2020-08-19
Attending: OTOLARYNGOLOGY
Payer: COMMERCIAL

## 2020-08-19 DIAGNOSIS — R76.8 POSITIVE ANA (ANTINUCLEAR ANTIBODY): Primary | ICD-10-CM

## 2020-08-19 DIAGNOSIS — R22.1 SUBMANDIBULAR SWELLING: ICD-10-CM

## 2020-08-19 DIAGNOSIS — R22.0 SUBMANDIBULAR SWELLING: ICD-10-CM

## 2020-08-19 RX ORDER — CEVIMELINE HYDROCHLORIDE 30 MG/1
30 CAPSULE ORAL 3 TIMES DAILY
Qty: 150 CAPSULE | Refills: 3 | Status: SHIPPED | OUTPATIENT
Start: 2020-08-19

## 2020-08-19 ASSESSMENT — PAIN SCALES - GENERAL: PAINLEVEL: MODERATE PAIN (5)

## 2020-08-19 NOTE — PROGRESS NOTES
"Sruthi Escobar is a 54 year old female who is being evaluated via a billable video visit.      The patient has been notified of following:     \"This video visit will be conducted via a call between you and your physician/provider. We have found that certain health care needs can be provided without the need for an in-person physical exam.  This service lets us provide the care you need with a video conversation.  If a prescription is necessary we can send it directly to your pharmacy.  If lab work is needed we can place an order for that and you can then stop by our lab to have the test done at a later time.    Video visits are billed at different rates depending on your insurance coverage.  Please reach out to your insurance provider with any questions.    If during the course of the call the physician/provider feels a video visit is not appropriate, you will not be charged for this service.\"    Patient has given verbal consent for Video visit? Yes  How would you like to obtain your AVS? MyChart    Will anyone else be joining your video visit? No        Video-Visit Details    Type of service:  phone visit 68 min        "
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
## 2020-08-21 NOTE — PATIENT INSTRUCTIONS
-Check labs: centromere, RNP, dsDNA, SPEP, CRP/ESR, RF, cryoglobulin, BMP, HIV, HepB titer. Will send results by [email or web address] when all are back.  -X-Ray c-spine  -Physical therapy referral for neck and shoulder  -Give cevimeline a trial for dryness (1-3 pills/day)  -Return to clinic 3 months  -Please contact us if no one has contacted you to set physical therapy referral.   -You can get the X-ray and labs done at any [facility].

## 2020-09-10 ENCOUNTER — OFFICE VISIT (OUTPATIENT)
Dept: INTERNAL MEDICINE | Facility: CLINIC | Age: 55
End: 2020-09-10
Payer: COMMERCIAL

## 2020-09-10 VITALS
HEART RATE: 80 BPM | SYSTOLIC BLOOD PRESSURE: 135 MMHG | OXYGEN SATURATION: 100 % | DIASTOLIC BLOOD PRESSURE: 86 MMHG | HEIGHT: 64 IN | BODY MASS INDEX: 35.27 KG/M2 | TEMPERATURE: 98.4 F | WEIGHT: 206.6 LBS

## 2020-09-10 DIAGNOSIS — E66.01 CLASS 2 SEVERE OBESITY DUE TO EXCESS CALORIES WITH SERIOUS COMORBIDITY IN ADULT, UNSPECIFIED BMI (H): ICD-10-CM

## 2020-09-10 DIAGNOSIS — Z12.4 SCREENING FOR MALIGNANT NEOPLASM OF CERVIX: Primary | ICD-10-CM

## 2020-09-10 DIAGNOSIS — E66.812 CLASS 2 SEVERE OBESITY DUE TO EXCESS CALORIES WITH SERIOUS COMORBIDITY IN ADULT, UNSPECIFIED BMI (H): ICD-10-CM

## 2020-09-10 DIAGNOSIS — Z12.31 ENCOUNTER FOR SCREENING MAMMOGRAM FOR BREAST CANCER: ICD-10-CM

## 2020-09-10 DIAGNOSIS — Z12.11 SCREENING FOR COLON CANCER: ICD-10-CM

## 2020-09-10 DIAGNOSIS — B35.4 TINEA CORPORIS: ICD-10-CM

## 2020-09-10 DIAGNOSIS — E66.01 CLASS 3 SEVERE OBESITY WITH SERIOUS COMORBIDITY IN ADULT, UNSPECIFIED BMI, UNSPECIFIED OBESITY TYPE (H): ICD-10-CM

## 2020-09-10 DIAGNOSIS — E66.813 CLASS 3 SEVERE OBESITY WITH SERIOUS COMORBIDITY IN ADULT, UNSPECIFIED BMI, UNSPECIFIED OBESITY TYPE (H): ICD-10-CM

## 2020-09-10 RX ORDER — MICONAZOLE NITRATE 20 MG/G
CREAM TOPICAL 2 TIMES DAILY
Qty: 198 G | Refills: 3 | Status: SHIPPED | OUTPATIENT
Start: 2020-09-10

## 2020-09-10 ASSESSMENT — MIFFLIN-ST. JEOR: SCORE: 1506.13

## 2020-09-10 ASSESSMENT — PAIN SCALES - GENERAL: PAINLEVEL: MILD PAIN (3)

## 2020-09-10 NOTE — PATIENT INSTRUCTIONS
[facility] Medication Refill Request Information:  * Please contact your pharmacy regarding ANY request for medication refills.  ** [facility] Prescription Fax = [phone]  * Please allow 3 business days for routine medication refills.  * Please allow 5 business days for controlled substance medication refills.     [facility] Test Result notification information:  *You will be notified with in 7-10 days of your appointment day regarding the results of your test.  If you are on MyChart you will be notified as soon as the provider has reviewed the results and signed off on them.    [facility]: [phone]

## 2020-09-10 NOTE — NURSING NOTE
Chief Complaint   Patient presents with     Physical     pt here for physical       Vandana Jo CMA at 2:16 PM on 9/10/2020.
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
## 2020-09-11 DIAGNOSIS — E66.01 CLASS 3 SEVERE OBESITY WITH SERIOUS COMORBIDITY IN ADULT, UNSPECIFIED BMI, UNSPECIFIED OBESITY TYPE (H): ICD-10-CM

## 2020-09-11 DIAGNOSIS — E66.813 CLASS 3 SEVERE OBESITY WITH SERIOUS COMORBIDITY IN ADULT, UNSPECIFIED BMI, UNSPECIFIED OBESITY TYPE (H): ICD-10-CM

## 2020-09-11 DIAGNOSIS — R22.1 SUBMANDIBULAR SWELLING: ICD-10-CM

## 2020-09-11 DIAGNOSIS — R22.0 SUBMANDIBULAR SWELLING: ICD-10-CM

## 2020-09-11 DIAGNOSIS — R76.8 POSITIVE ANA (ANTINUCLEAR ANTIBODY): ICD-10-CM

## 2020-09-11 LAB
CRP SERPL-MCNC: 4 MG/L (ref 0–8)
ERYTHROCYTE [SEDIMENTATION RATE] IN BLOOD BY WESTERGREN METHOD: 26 MM/H (ref 0–30)
HBA1C MFR BLD: 6 % (ref 0–5.6)
TSH SERPL DL<=0.005 MIU/L-ACNC: 1.67 MU/L (ref 0.4–4)

## 2020-09-12 LAB
CENTROMERE IGG SER-ACNC: <0.2 AI (ref 0–0.9)
ENA RNP IGG SER IA-ACNC: 0.4 AI (ref 0–0.9)

## 2020-09-13 LAB — HIV 1+2 AB+HIV1 P24 AG SERPL QL IA: NONREACTIVE

## 2020-09-14 LAB
ALBUMIN SERPL ELPH-MCNC: 4.1 G/DL (ref 3.7–5.1)
ALPHA1 GLOB SERPL ELPH-MCNC: 0.3 G/DL (ref 0.2–0.4)
ALPHA2 GLOB SERPL ELPH-MCNC: 0.8 G/DL (ref 0.5–0.9)
B-GLOBULIN SERPL ELPH-MCNC: 0.9 G/DL (ref 0.6–1)
GAMMA GLOB SERPL ELPH-MCNC: 1.6 G/DL (ref 0.7–1.6)
M PROTEIN SERPL ELPH-MCNC: 0 G/DL
PROT PATTERN SERPL ELPH-IMP: NORMAL
RHEUMATOID FACT SER NEPH-ACNC: <7 IU/ML (ref 0–20)

## 2020-09-15 LAB
DSDNA AB SER-ACNC: 1 IU/ML
HBV DNA SERPL NAA+PROBE-ACNC: NORMAL [IU]/ML
HBV DNA SERPL NAA+PROBE-LOG IU: NORMAL {LOG_IU}/ML

## 2020-09-16 LAB
COPATH REPORT: NORMAL
PAP: NORMAL

## 2020-09-17 LAB
CRYOGLOB SER QL: ABNORMAL %
FINAL DIAGNOSIS: NORMAL
HPV HR 12 DNA CVX QL NAA+PROBE: NEGATIVE
HPV16 DNA SPEC QL NAA+PROBE: NEGATIVE
HPV18 DNA SPEC QL NAA+PROBE: NEGATIVE
SPECIMEN DESCRIPTION: NORMAL
SPECIMEN SOURCE CVX/VAG CYTO: NORMAL

## 2020-09-18 ENCOUNTER — THERAPY VISIT (OUTPATIENT)
Dept: PHYSICAL THERAPY | Facility: CLINIC | Age: 55
End: 2020-09-18
Payer: COMMERCIAL

## 2020-09-18 DIAGNOSIS — M25.519 SHOULDER PAIN: ICD-10-CM

## 2020-09-18 PROCEDURE — 97161 PT EVAL LOW COMPLEX 20 MIN: CPT | Mod: GP | Performed by: PHYSICAL THERAPIST

## 2020-09-18 PROCEDURE — 97112 NEUROMUSCULAR REEDUCATION: CPT | Mod: GP | Performed by: PHYSICAL THERAPIST

## 2020-09-18 PROCEDURE — 97110 THERAPEUTIC EXERCISES: CPT | Mod: GP | Performed by: PHYSICAL THERAPIST

## 2020-09-18 NOTE — PROGRESS NOTES
[facility] Initial Evaluation  Subjective:  The history is provided by the patient.   Patient Health History         Pain is reported as 6/10 on pain scale.  General health as reported by patient is good.  Pertinent medical history includes: high blood pressure, thyroid problems and overweight.   Red flags:  None as reported by patient.  Medical allergies: none.   Surgeries include:  Other. Other surgery history details: thyroid.    Current medications:  High blood pressure medication.    Current occupation is DNP, CNP.   Primary job tasks include:  Prolonged sitting and prolonged standing.                  Therapist Generated HPI Evaluation  Problem details: Shoulder/UT pain began about 4 years ago .         Type of problem:  Bilateral shoulders (L > R).    This is a chronic condition.  Condition occurred with:  Unknown cause.  Where condition occurred: for unknown reasons.  Patient reports pain:  Anterior and lateral.  Pain is described as aching and shooting and is intermittent.  Pain radiates to:  Upper arm. Pain timing: no pattern.    Associated symptoms:  Loss of motion/stiffness, painful arc and loss of strength. Symptoms are exacerbated by using arm at shoulder level, using arm behind back, using arm overhead and lying on extremity  and relieved by rest and ice.      Restrictions due to condition include:  Working in normal job without restrictions.  Barriers include:  None as reported by patient.                        Objective:  Standing Alignment:    Cervical/Thoracic:  Forward head  Shoulder/UE:  Rounded shoulders, protracted scapula L and protracted scapula R                  Flexibility/Screens:     Upper Extremity:    Decreased left upper extremity flexibility at:  Pectoralis Minor      Spine:  Decreased left spine flexibility:  Rhomboids; Upper Trap and Levator    Decreased right spine flexibility:  Rhomboids; Upper Trap and Levator                  Cervical/Thoracic  Evaluation  Cervical AROM: normal         Headaches: none  Cervical Myotomes:  normal                      Cervical Dermatomes:  normal                    Cervical Palpation:    Tenderness present at Left:    Upper Trap; Levator and Erector Spinae  Tenderness present at Right:    Upper Trap; Levator and Erector Spinae      Spinal Segmental Conclusions:    Level:  Hypo at C4, C5, C6, T1, C7, T2, T3 and T4             Shoulder Evaluation:  ROM:  AROM:  normal                              Pain: mid range abduction, flexion, end range ER    Strength:    Flexion: Left:5/5   Pain:    Right: 5/5     Pain:     Abduction:  Left: 4+/5  Pain:    Right: 4+/5     Pain:      External Rotation:   Left:4+/5     Pain:   Right:4+/5     Pain:            Stability Testing:  normal      Special Tests:    Left shoulder positive for the following special tests:  Impingement    Palpation:    Left shoulder tenderness present at:  Supraspinatus; Rhomboids; Upper Trap and Bicipital Groove    Right shoulder tenderness not present at:Levator or Upper Trap                                     General     ROS    Assessment/Plan:    Patient is a 55 year old female with cervical complaints.    Patient has the following significant findings with corresponding treatment plan.                Diagnosis 1:  L UT/shoulder pain  Pain -  hot/cold therapy, manual therapy, self management, education and home program  Decreased ROM/flexibility - manual therapy, therapeutic exercise and home program  Decreased strength - therapeutic exercise, therapeutic activities and home program    Therapy Evaluation Codes:   1) History comprised of:   Personal factors that impact the plan of care:      None.    Comorbidity factors that impact the plan of care are:      None.     Medications impacting care: None.  2) Examination of Body Systems comprised of:   Body structures and functions that impact the plan of care:      Cervical spine, Shoulder and Thoracic  Spine.   Activity limitations that impact the plan of care are:      Bathing, Driving, Dressing, Lifting, Reading/Computer work, Sitting, Working and Sleeping.  3) Clinical presentation characteristics are:   Stable/Uncomplicated.  4) Decision-Making    Low complexity using standardized patient assessment instrument and/or measureable assessment of functional outcome.  Cumulative Therapy Evaluation is: Low complexity.    Previous and current functional limitations:  (See Goal Flow Sheet for this information)    Short term and Long term goals: (See Goal Flow Sheet for this information)     Communication ability:  Patient appears to be able to clearly communicate and understand verbal and written communication and follow directions correctly.  Treatment Explanation - The following has been discussed with the patient:   RX ordered/plan of care  Anticipated outcomes  Possible risks and side effects  This patient would benefit from PT intervention to resume normal activities.   Rehab potential is good.    Frequency:  1 X week, once daily  Duration:  for 6 weeks  Discharge Plan:  Achieve all LTG.  Independent in home treatment program.  Reach maximal therapeutic benefit.    Please refer to the daily flowsheet for treatment today, total treatment time and time spent performing 1:1 timed codes.

## 2020-09-21 PROBLEM — M25.519 SHOULDER PAIN: Status: ACTIVE | Noted: 2020-09-21

## 2020-09-25 ENCOUNTER — THERAPY VISIT (OUTPATIENT)
Dept: PHYSICAL THERAPY | Facility: CLINIC | Age: 55
End: 2020-09-25
Payer: COMMERCIAL

## 2020-09-25 DIAGNOSIS — M54.2 NECK PAIN: Primary | ICD-10-CM

## 2020-09-25 PROCEDURE — 97110 THERAPEUTIC EXERCISES: CPT | Mod: GP | Performed by: PHYSICAL THERAPIST

## 2020-09-25 PROCEDURE — 97140 MANUAL THERAPY 1/> REGIONS: CPT | Mod: GP | Performed by: PHYSICAL THERAPIST

## 2020-10-02 ENCOUNTER — ANCILLARY PROCEDURE (OUTPATIENT)
Dept: MAMMOGRAPHY | Facility: CLINIC | Age: 55
End: 2020-10-02
Attending: NURSE PRACTITIONER
Payer: COMMERCIAL

## 2020-10-02 DIAGNOSIS — Z12.31 ENCOUNTER FOR SCREENING MAMMOGRAM FOR BREAST CANCER: ICD-10-CM

## 2020-10-02 PROCEDURE — 77067 SCR MAMMO BI INCL CAD: CPT | Performed by: RADIOLOGY

## 2020-10-02 PROCEDURE — 77063 BREAST TOMOSYNTHESIS BI: CPT | Performed by: RADIOLOGY

## 2020-10-09 ENCOUNTER — THERAPY VISIT (OUTPATIENT)
Dept: PHYSICAL THERAPY | Facility: CLINIC | Age: 55
End: 2020-10-09
Payer: COMMERCIAL

## 2020-10-09 DIAGNOSIS — M25.519 SHOULDER PAIN: ICD-10-CM

## 2020-10-09 PROCEDURE — 97110 THERAPEUTIC EXERCISES: CPT | Mod: GP | Performed by: PHYSICAL THERAPIST

## 2020-10-20 ENCOUNTER — MYC MEDICAL ADVICE (OUTPATIENT)
Dept: INTERNAL MEDICINE | Facility: CLINIC | Age: 55
End: 2020-10-20

## 2020-11-29 ENCOUNTER — HEALTH MAINTENANCE LETTER (OUTPATIENT)
Age: 55
End: 2020-11-29

## 2020-12-05 ENCOUNTER — OFFICE VISIT (OUTPATIENT)
Dept: URGENT CARE | Facility: URGENT CARE | Age: 55
End: 2020-12-05
Payer: COMMERCIAL

## 2020-12-05 VITALS
SYSTOLIC BLOOD PRESSURE: 132 MMHG | HEART RATE: 74 BPM | OXYGEN SATURATION: 100 % | WEIGHT: 206 LBS | DIASTOLIC BLOOD PRESSURE: 88 MMHG | TEMPERATURE: 98.1 F | BODY MASS INDEX: 35.43 KG/M2

## 2020-12-05 DIAGNOSIS — L30.4 INTERTRIGO: ICD-10-CM

## 2020-12-05 DIAGNOSIS — L03.119 CELLULITIS OF LOWER EXTREMITY, UNSPECIFIED LATERALITY: Primary | ICD-10-CM

## 2020-12-05 PROCEDURE — 99213 OFFICE O/P EST LOW 20 MIN: CPT | Performed by: PHYSICIAN ASSISTANT

## 2020-12-05 RX ORDER — NYSTATIN 100000 [USP'U]/G
POWDER TOPICAL 2 TIMES DAILY
Qty: 60 G | Refills: 0 | Status: SHIPPED | OUTPATIENT
Start: 2020-12-05 | End: 2020-12-19

## 2020-12-05 RX ORDER — CEPHALEXIN 500 MG/1
500 CAPSULE ORAL 4 TIMES DAILY
Qty: 28 CAPSULE | Refills: 0 | Status: SHIPPED | OUTPATIENT
Start: 2020-12-05 | End: 2020-12-12

## 2020-12-05 NOTE — PROGRESS NOTES
SUBJECTIVE:  Sruthi Escobar is a 55 year old female who presents to the clinic today for a rash.  Onset of rash was 1 week(s) ago.   Rash is gradual onset.  Location of the rash: inner thghs.  Quality/symptoms of rash: itching, burning, painful and red   Symptoms are moderate and rash seems to be worsening.  Previous history of a similar rash? No  Recent exposure history: none known    Associated symptoms include: nothing.    Past Medical History:   Diagnosis Date     Anemia      Bronchiectasis (H)      Chronic tonsillitis      Gastroesophageal reflux disease      Hypertension      Migraines      Pain in shoulder region, left      Thyroid disease      Current Outpatient Medications   Medication Sig Dispense Refill     acetaminophen (TYLENOL) 325 MG tablet Take 325-650 mg by mouth every 6 hours as needed for mild pain (PT last dose approx 2 weeks ago 1.15.2020)       calcium-vitamin D (CALTRATE) 600-400 MG-UNIT per tablet Take 1 tablet by mouth as needed (PT last dose a week ago 1.15.2020)        cephALEXin (KEFLEX) 500 MG capsule Take 1 capsule (500 mg) by mouth 4 times daily for 7 days 28 capsule 0     cevimeline (EVOXAC) 30 MG capsule Take 1 capsule (30 mg) by mouth 3 times daily Can also take once or twice daily. 150 capsule 3     ferrous sulfate ([employer] IRON) 325 (65 Fe) MG tablet Take 325 mg by mouth as needed (PT last dose a week ago 1.15.2020)        hydrochlorothiazide (MICROZIDE) 12.5 MG capsule Take 1 capsule (12.5 mg) by mouth daily (Patient taking differently: Take 12.5 mg by mouth every other day ) 90 capsule 3     lisinopril (PRINIVIL/ZESTRIL) 10 MG tablet Take 1 tablet (10 mg) by mouth daily (Patient taking differently: Take 10 mg by mouth every morning ) 90 tablet 3     miconazole (MICATIN) 2 % external cream Apply topically 2 times daily 198 g 3     miconazole (MICATIN) 2 % external powder Apply topically as needed for itching or other (as needed once daily) 71 g 3     Multiple Vitamin  (MULTIVITAMIN  S) CAPS Take 1 capsule by mouth as needed (PT last dose a week ago 1.15.2020)        nystatin (MYCOSTATIN) 220986 UNIT/GM external powder Apply topically 2 times daily for 14 days 60 g 0     albuterol (PROAIR HFA/PROVENTIL HFA/VENTOLIN HFA) 108 (90 Base) MCG/ACT Inhaler Inhale 2 puffs into the lungs every 6 hours (Patient not taking: Reported on 12/5/2020) 1 Inhaler 3     Social History     Tobacco Use     Smoking status: Never Smoker     Smokeless tobacco: Never Used   Substance Use Topics     Alcohol use: No       ROS:  10 point ROS negative except as listed above      EXAM:   /88   Pulse 74   Temp 98.1  F (36.7  C) (Tympanic)   Wt 93.4 kg (206 lb)   SpO2 100%   BMI 35.43 kg/m    GENERAL: alert, no acute distress.  SKIN: area of thigh contact is erythematous and superficially friable. No abscess or discharge noted  GENERAL APPEARANCE: healthy, alert and no distress  RESP: lungs clear to auscultation - no rales, rhonchi or wheezes  CV: regular rates and rhythm, normal S1 S2, no murmur noted  NEURO: Normal strength and tone, sensory exam grossly normal,  normal speech and mentation    ASSESSMENT:   (L03.119) Cellulitis of lower extremity, unspecified laterality  (primary encounter diagnosis)  Comment: coviering for secondary strep infection  Plan: cephALEXin (KEFLEX) 500 MG capsule        (L30.4) Intertrigo  Comment: appears to be yeast opportunistic infection  Plan: nystatin (MYCOSTATIN) 109570 UNIT/GM external         powder        Red flags and emergent follow up discussed, and understood by patient  Follow up with PCP if symptoms worsen or fail to improve in 3 days or resolve in 14      Patient Instructions     Patient Education     Candida Skin Infection (Adult)   Candida is a type of yeast. It grows naturally on the skin and in the mouth. If it grows out of control, it can cause an infection. Candida can cause infections in the genital area, skin folds, in the mouth, and under the  breasts. Anyone can get this infection. It is more common in a person with a weak immune system, such as from diabetes, HIV, or cancer. It s also more common in someone who has been on antibiotic therapy. And it s more common in people who are overweight or who have incontinence. Wearing tight-fitting clothing and taking part in activities with lots of skin-to-skin contact can also put you at risk.  Candida causes the skin to become bright red and inflamed. The border of the infected part of the skin is often raised. The infection causes pain and itching. Sometimes the skin peels and bleeds. In the mouth, candida is called thrush, and may cause white thickened areas.  A Candida rash is most often treated with an antifungal cream, gel, or powder. . The rash will clear a few days after starting the medicine. Infections that don t go away may need a prescription medicine. In rare cases, a bacterial infection can also occur.  Home care  Your healthcare provider will advise using an antifungal cream, powder, or gel for the rash. He or she may also prescribe a medicine for the itch. Follow all instructions for using these medicines.  General care    Keep your skin clean by washing the area twice a day.    Use the medicine as directed until your rash is gone. Once the skin has healed, keep it dry to prevent another infection.     If you are overweight, talk with your healthcare provider about a plan to lose extra weight.    Don't wear tight-fitting clothes.    Follow-up care  Follow up with your healthcare provider, or as advised. Your rash will clear in 7 to 14 days. Call your provider if the rash is not gone after 14 days.  When to get medical advice  Call your healthcare provider right away if any of these occur:    Pain or redness that gets worse or spreads    Fluid coming from the skin    Yellow crusts on the skin    Fever of 100.4 F (38 C) or higher, or as directed by your provider  Amrit last reviewed this  educational content on 10/1/2019    4540-0802 [employer]. [address] [place] [address], [place] [address]. All rights reserved. This information is not intended as a substitute for professional medical care. Always follow your healthcare professional's instructions.           Patient Education     Cellulitis  Cellulitis is an infection of the deep layers of skin. A break in the skin, such as a cut or scratch, can let bacteria under the skin. If the bacteria get to deep layers of the skin, it can be serious. If not treated, cellulitis can get into the bloodstream and lymph nodes. The infection can then spread throughout the body. This causes serious illness.   Cellulitis causes the affected skin to become red, swollen, warm, and sore. The reddened areas have a visible border. An open sore may leak fluid (pus). You may have a fever, chills, and pain.   Cellulitis is treated with antibiotics taken for 7 to 10 days. An open sore may be cleaned and covered with cool wet gauze. Symptoms should get better 1 to 2 days after treatment is started. Make sure to take all the antibiotics for the full number of days until they are gone. Keep taking the medicine even if your symptoms go away.   Home care  Follow these tips:    Limit the use of the part of your body with cellulitis.     If the infection is on your leg, keep your leg raised while sitting. This helps reduce swelling.    Take all of the antibiotic medicine exactly as directed until it is gone. Don't miss any doses, especially during the first 7 days. Don t stop taking the medicine when your symptoms get better.    Keep the affected area clean and dry.    Wash your hands with soap and clean, running water before and after touching your skin. Anyone else who touches your skin should also wash his or her hands. Don't share towels.  Follow-up care  Follow up with your healthcare provider, or as advised. If your infection doesn't go away on the first antibiotic,  your healthcare provider will prescribe a different one.   When to seek medical advice  Call your healthcare provider right away if any of these occur:    Red areas that spread    Swelling or pain that gets worse    Fluid leaking from the skin (pus)    Fever higher of 100.4  F (38.0  C) or higher after 2 days on antibiotics  Amrit last reviewed this educational content on 8/1/2019 2000-2020 [employer]. [address] [place] [address], [place] [address]. All rights reserved. This information is not intended as a substitute for professional medical care. Always follow your healthcare professional's instructions.

## 2020-12-05 NOTE — PATIENT INSTRUCTIONS
Patient Education     Candida Skin Infection (Adult)   Candida is a type of yeast. It grows naturally on the skin and in the mouth. If it grows out of control, it can cause an infection. Candida can cause infections in the genital area, skin folds, in the mouth, and under the breasts. Anyone can get this infection. It is more common in a person with a weak immune system, such as from diabetes, HIV, or cancer. It s also more common in someone who has been on antibiotic therapy. And it s more common in people who are overweight or who have incontinence. Wearing tight-fitting clothing and taking part in activities with lots of skin-to-skin contact can also put you at risk.  Candida causes the skin to become bright red and inflamed. The border of the infected part of the skin is often raised. The infection causes pain and itching. Sometimes the skin peels and bleeds. In the mouth, candida is called thrush, and may cause white thickened areas.  A Candida rash is most often treated with an antifungal cream, gel, or powder. . The rash will clear a few days after starting the medicine. Infections that don t go away may need a prescription medicine. In rare cases, a bacterial infection can also occur.  Home care  Your healthcare provider will advise using an antifungal cream, powder, or gel for the rash. He or she may also prescribe a medicine for the itch. Follow all instructions for using these medicines.  General care    Keep your skin clean by washing the area twice a day.    Use the medicine as directed until your rash is gone. Once the skin has healed, keep it dry to prevent another infection.     If you are overweight, talk with your healthcare provider about a plan to lose extra weight.    Don't wear tight-fitting clothes.    Follow-up care  Follow up with your healthcare provider, or as advised. Your rash will clear in 7 to 14 days. Call your provider if the rash is not gone after 14 days.  When to get medical  advice  Call your healthcare provider right away if any of these occur:    Pain or redness that gets worse or spreads    Fluid coming from the skin    Yellow crusts on the skin    Fever of 100.4 F (38 C) or higher, or as directed by your provider  Amrit last reviewed this educational content on 10/1/2019    0321-5652 [employer]. [address] [place] [address]. All rights reserved. This information is not intended as a substitute for professional medical care. Always follow your healthcare professional's instructions.           Patient Education     Cellulitis  Cellulitis is an infection of the deep layers of skin. A break in the skin, such as a cut or scratch, can let bacteria under the skin. If the bacteria get to deep layers of the skin, it can be serious. If not treated, cellulitis can get into the bloodstream and lymph nodes. The infection can then spread throughout the body. This causes serious illness.   Cellulitis causes the affected skin to become red, swollen, warm, and sore. The reddened areas have a visible border. An open sore may leak fluid (pus). You may have a fever, chills, and pain.   Cellulitis is treated with antibiotics taken for 7 to 10 days. An open sore may be cleaned and covered with cool wet gauze. Symptoms should get better 1 to 2 days after treatment is started. Make sure to take all the antibiotics for the full number of days until they are gone. Keep taking the medicine even if your symptoms go away.   Home care  Follow these tips:    Limit the use of the part of your body with cellulitis.     If the infection is on your leg, keep your leg raised while sitting. This helps reduce swelling.    Take all of the antibiotic medicine exactly as directed until it is gone. Don't miss any doses, especially during the first 7 days. Don t stop taking the medicine when your symptoms get better.    Keep the affected area clean and dry.    Wash your hands with soap and  clean, running water before and after touching your skin. Anyone else who touches your skin should also wash his or her hands. Don't share towels.  Follow-up care  Follow up with your healthcare provider, or as advised. If your infection doesn't go away on the first antibiotic, your healthcare provider will prescribe a different one.   When to seek medical advice  Call your healthcare provider right away if any of these occur:    Red areas that spread    Swelling or pain that gets worse    Fluid leaking from the skin (pus)    Fever higher of 100.4  F (38.0  C) or higher after 2 days on antibiotics  Amrit last reviewed this educational content on 8/1/2019 2000-2020 [employer]. [address] [place] [address], [place] [address]. All rights reserved. This information is not intended as a substitute for professional medical care. Always follow your healthcare professional's instructions.

## 2021-01-07 ENCOUNTER — OFFICE VISIT (OUTPATIENT)
Dept: FAMILY MEDICINE | Facility: CLINIC | Age: 56
End: 2021-01-07
Payer: COMMERCIAL

## 2021-01-07 VITALS
OXYGEN SATURATION: 97 % | WEIGHT: 212.5 LBS | RESPIRATION RATE: 16 BRPM | HEIGHT: 64 IN | SYSTOLIC BLOOD PRESSURE: 142 MMHG | HEART RATE: 82 BPM | TEMPERATURE: 98.1 F | BODY MASS INDEX: 36.28 KG/M2 | DIASTOLIC BLOOD PRESSURE: 87 MMHG

## 2021-01-07 DIAGNOSIS — I10 BENIGN ESSENTIAL HYPERTENSION: ICD-10-CM

## 2021-01-07 DIAGNOSIS — E66.01 MORBID OBESITY (H): ICD-10-CM

## 2021-01-07 DIAGNOSIS — R73.03 PRE-DIABETES: ICD-10-CM

## 2021-01-07 DIAGNOSIS — B37.2 YEAST INFECTION OF THE SKIN: Primary | ICD-10-CM

## 2021-01-07 LAB
ALBUMIN SERPL-MCNC: 3.3 G/DL (ref 3.4–5)
ALP SERPL-CCNC: 132 U/L (ref 40–150)
ALT SERPL W P-5'-P-CCNC: 63 U/L (ref 0–50)
ANION GAP SERPL CALCULATED.3IONS-SCNC: 4 MMOL/L (ref 3–14)
AST SERPL W P-5'-P-CCNC: 31 U/L (ref 0–45)
BILIRUB SERPL-MCNC: 0.4 MG/DL (ref 0.2–1.3)
BUN SERPL-MCNC: 12 MG/DL (ref 7–30)
CALCIUM SERPL-MCNC: 9 MG/DL (ref 8.5–10.1)
CHLORIDE SERPL-SCNC: 107 MMOL/L (ref 94–109)
CO2 SERPL-SCNC: 30 MMOL/L (ref 20–32)
CREAT SERPL-MCNC: 0.77 MG/DL (ref 0.52–1.04)
GFR SERPL CREATININE-BSD FRML MDRD: 85 ML/MIN/{1.73_M2}
GLUCOSE SERPL-MCNC: 110 MG/DL (ref 70–99)
HBA1C MFR BLD: 6 % (ref 0–5.6)
POTASSIUM SERPL-SCNC: 3.8 MMOL/L (ref 3.4–5.3)
PROT SERPL-MCNC: 7.6 G/DL (ref 6.8–8.8)
SODIUM SERPL-SCNC: 140 MMOL/L (ref 133–144)

## 2021-01-07 PROCEDURE — 99213 OFFICE O/P EST LOW 20 MIN: CPT | Performed by: NURSE PRACTITIONER

## 2021-01-07 PROCEDURE — 36415 COLL VENOUS BLD VENIPUNCTURE: CPT | Performed by: PATHOLOGY

## 2021-01-07 PROCEDURE — 83036 HEMOGLOBIN GLYCOSYLATED A1C: CPT | Performed by: PATHOLOGY

## 2021-01-07 PROCEDURE — 80053 COMPREHEN METABOLIC PANEL: CPT | Performed by: PATHOLOGY

## 2021-01-07 RX ORDER — FLUCONAZOLE 100 MG/1
TABLET ORAL
Qty: 14 TABLET | Refills: 0 | Status: SHIPPED | OUTPATIENT
Start: 2021-01-07

## 2021-01-07 RX ORDER — LISINOPRIL 30 MG/1
30 TABLET ORAL DAILY
Qty: 90 TABLET | Refills: 0 | Status: SHIPPED | OUTPATIENT
Start: 2021-01-07 | End: 2021-04-07

## 2021-01-07 ASSESSMENT — MIFFLIN-ST. JEOR: SCORE: 1532.94

## 2021-01-07 ASSESSMENT — PAIN SCALES - GENERAL: PAINLEVEL: NO PAIN (0)

## 2021-01-07 NOTE — NURSING NOTE
Chief Complaint   Patient presents with     Diabetes     Patient comes in to discuss diabetes and for mediation refills.          Shan Bass MA on 1/7/2021 at 2:57 PM

## 2021-01-07 NOTE — PATIENT INSTRUCTIONS

## 2021-01-07 NOTE — PROGRESS NOTES
"  Assessment & Plan     Benign essential hypertension  - lisinopril (ZESTRIL) 30 MG tablet  Dispense: 90 tablet; Refill: 0  - Comprehensive metabolic panel    Morbid obesity (H)  - Metofrmin  - Hemoglobin A1c    Yeast infection of the skin  - fluconazole (DIFLUCAN) 100 MG tablet  Dispense: 14 tablet; Refill: 0  - 2 tablets today, then 1 tablet daily for 14 days.    Pre-diabetes  - Metformin  - Hemoglobin A1c    17 minutes spent on the date of the encounter doing chart review, history and exam, documentation and further activities as noted above         BMI:   Estimated body mass index is 36.54 kg/m  as calculated from the following:    Height as of this encounter: 1.624 m (5' 3.94\").    Weight as of this encounter: 96.4 kg (212 lb 8 oz).   Weight management plan: Discussed healthy diet and exercise guidelines and start metformin.      See Patient Instructions  See plana above.  Follow-up in 2 weeks, sooner if needed.    RAUL Guadarrama, CNP  [facility] TORY Negro is a 55 year old who presents to clinic today for the following health issues  accompanied by herself:    HPI   Patient is here for follow-up for pre-diabetes, hypertension and weight management.The patient has been increasing her 10mg tablets of lisinopril.    She is concerned because her blood pressure is increasing so she has increased her lisinopril to 30mg.  She quarles been checking her blood pressure at work and it has been in the 140's.  She declines chest pain or shortness of breath.  She states she has made changes in her diet, including staying away from carbohydrates.  She has been exercising as well since the gyms are not open.  Her gym was closed due to covid.  She has been doing group exercise glasses including génesis, running and steps 4 days per week for 45 minutes to an hour.   She has been going to the gym after work.  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  She also quarles incwilliam with a yeasta infection in between her inner thighs.  No drainage but there is redness.   No fever.   Lab Results   Component Value Date    A1C 6.0 09/11/2020     Wt Readings from Last 2 Encounters:   01/07/21 96.4 kg (212 lb 8 oz)   12/05/20 93.4 kg (206 lb)       Review of Systems   Constitutional, HEENT, cardiovascular, pulmonary, gi and gu systems are negative, except as otherwise noted.      Objective    BP (!) 142/87   Pulse 82   Temp 98.1  F (36.7  C) (Oral)   Resp 16   Ht 1.624 m (5' 3.94\")   Wt 96.4 kg (212 lb 8 oz)   SpO2 97%   BMI 36.54 kg/m    Body mass index is 36.54 kg/m .  Physical Exam   GENERAL: healthy, alert and no distress  RESP: lungs clear to auscultation - no rales, rhonchi or wheezes  ABDOMEN: soft, nontender, no hepatosplenomegaly, no masses and bowel sounds normal  SKIN:  moist, beefy, homogenous patch between inner thighs  PSYCH: mentation appears normal, affect normal/bright    Results for orders placed or performed in visit on 01/07/21   Hemoglobin A1c     Status: Abnormal   Result Value Ref Range    Hemoglobin A1C 6.0 (H) 0 - 5.6 %   Comprehensive metabolic panel     Status: Abnormal   Result Value Ref Range    Sodium 140 133 - 144 mmol/L    Potassium 3.8 3.4 - 5.3 mmol/L    Chloride 107 94 - 109 mmol/L    Carbon Dioxide 30 20 - 32 mmol/L    Anion Gap 4 3 - 14 mmol/L    Glucose 110 (H) 70 - 99 mg/dL    Urea Nitrogen 12 7 - 30 mg/dL    Creatinine 0.77 0.52 - 1.04 mg/dL    GFR Estimate 85 >60 mL/min/[1.73_m2]    GFR Estimate If Black >90 >60 mL/min/[1.73_m2]    Calcium 9.0 8.5 - 10.1 mg/dL    Bilirubin Total 0.4 0.2 - 1.3 mg/dL    Albumin 3.3 (L) 3.4 - 5.0 g/dL    Protein Total 7.6 6.8 - 8.8 g/dL    Alkaline Phosphatase 132 40 - 150 U/L    ALT 63 (H) 0 - 50 U/L    AST 31 0 - 45 U/L       I spent a total of 17 minutes face-to-face with Sruthi Escobar during today's office visit.  Over 50% of this time was " spent counseling the patient and/or coordinating care regarding symptoms and plan of care along with charat review and charting See note for details.

## 2021-01-21 ENCOUNTER — OFFICE VISIT (OUTPATIENT)
Dept: FAMILY MEDICINE | Facility: CLINIC | Age: 56
End: 2021-01-21
Payer: COMMERCIAL

## 2021-01-21 VITALS
OXYGEN SATURATION: 97 % | TEMPERATURE: 98.2 F | BODY MASS INDEX: 36.46 KG/M2 | SYSTOLIC BLOOD PRESSURE: 148 MMHG | WEIGHT: 212 LBS | HEART RATE: 71 BPM | DIASTOLIC BLOOD PRESSURE: 91 MMHG

## 2021-01-21 DIAGNOSIS — I10 BENIGN ESSENTIAL HYPERTENSION: ICD-10-CM

## 2021-01-21 DIAGNOSIS — Z76.89 ENCOUNTER FOR WEIGHT MANAGEMENT: Primary | ICD-10-CM

## 2021-01-21 DIAGNOSIS — R73.03 PRE-DIABETES: ICD-10-CM

## 2021-01-21 PROCEDURE — 99214 OFFICE O/P EST MOD 30 MIN: CPT | Performed by: NURSE PRACTITIONER

## 2021-01-21 RX ORDER — LIRAGLUTIDE 6 MG/ML
0.6 INJECTION SUBCUTANEOUS DAILY
Qty: 3 ML | Refills: 0 | Status: SHIPPED | OUTPATIENT
Start: 2021-01-21

## 2021-01-21 ASSESSMENT — PAIN SCALES - GENERAL: PAINLEVEL: NO PAIN (0)

## 2021-01-21 NOTE — PROGRESS NOTES
Assessment & Plan     Encounter for weight management  - Okay to step metformin and start victoza - follow-up with comprehensive weight management clinic for further management.   - liraglutide (VICTOZA) 18 MG/3ML solution  Dispense: 3 mL; Refill: 0  - COMPREHENSIVE WEIGHT MANAGEMENT  - insulin pen needle (31G X 5 MM) 31G X 5 MM miscellaneous  Dispense: 90 each; Refill: 1    Benign essential hypertension  - Discussed BP, patient does not want to change lisinopril and will continue to monitor her blood pressure.    Pre-diabetes  - COMPREHENSIVE WEIGHT MANAGEMENT      10 minutes spent on the date of the encounter doing chart review, history and exam, documentation and further activities as noted above           See Patient Instructions  Follow-up with comprehensive weight management.    Patient will continue to monitor BP, does not want to adjust BP medication at this time.  Fungal rash resolved.  Return to clinic if no improvement or symptoms worsen.  Patient verbalized understanding & agreed with plan of care.    RAUL Guadarrama, CNP  [facility] TORY Negro is a 55 year old who presents to clinic today for the following health issues  accompanied by her herself:    HPI   Patient is here for follow-up regarding obesity, pre-diabetes, hypertension, and skin fungal infection.  Patient completed a 14 day course of diflucan and states her skin fungal infection has resolved.  She continues with concerns regarding her blood pressure and weight.  She does not think the metformin is going to be helpful and requests victoza to help with weight management.  She is open to a referral to the medical weight management clinic.  She states her blood pressure remains elevated but does not want to increase her lisinopril at this point.  She declines chest pain or SOB.     She declines any other questions or concerns.     BP Readings from Last 6 Encounters:    01/21/21 (!) 148/91   01/07/21 (!) 142/87   12/05/20 132/88   09/10/20 135/86   05/15/20 130/80   01/28/20 127/80     Wt Readings from Last 2 Encounters:   01/21/21 96.2 kg (212 lb)   01/07/21 96.4 kg (212 lb 8 oz)       Review of Systems   Constitutional, HEENT, cardiovascular, pulmonary, gi and gu systems are negative, except as otherwise noted.      Objective    BP (!) 148/91   Pulse 71   Temp 98.2  F (36.8  C)   Wt 96.2 kg (212 lb)   SpO2 97%   BMI 36.46 kg/m    Body mass index is 36.46 kg/m .  Physical Exam   GENERAL: healthy, alert and no distress  RESP: lungs clear to auscultation - no rales, rhonchi or wheezes  CV: regular rate and rhythm, normal S1 S2, no S3 or S4, no murmur, click or rub, no peripheral edema and peripheral pulses strong  SKIN: erythematous, beefy patches between inner thighs has resolved. Hypopigmented areas remain.   PSYCH: mentation appears normal, affect normal/bright    Orders Only on 01/07/2021   Component Date Value Ref Range Status     Hemoglobin A1C 01/07/2021 6.0* 0 - 5.6 % Final    Comment: Normal <5.7% Prediabetes 5.7-6.4%  Diabetes 6.5% or higher - adopted from ADA   consensus guidelines.       Sodium 01/07/2021 140  133 - 144 mmol/L Final     Potassium 01/07/2021 3.8  3.4 - 5.3 mmol/L Final     Chloride 01/07/2021 107  94 - 109 mmol/L Final     Carbon Dioxide 01/07/2021 30  20 - 32 mmol/L Final     Anion Gap 01/07/2021 4  3 - 14 mmol/L Final     Glucose 01/07/2021 110* 70 - 99 mg/dL Final     Urea Nitrogen 01/07/2021 12  7 - 30 mg/dL Final     Creatinine 01/07/2021 0.77  0.52 - 1.04 mg/dL Final     GFR Estimate 01/07/2021 85  >60 mL/min/[1.73_m2] Final    Comment: Non  GFR Calc  Starting 12/18/2018, serum creatinine based estimated GFR (eGFR) will be   calculated using the Chronic Kidney Disease Epidemiology Collaboration   (CKD-EPI) equation.       GFR Estimate If Black 01/07/2021 >90  >60 mL/min/[1.73_m2] Final    Comment:  GFR  Calc  Starting 12/18/2018, serum creatinine based estimated GFR (eGFR) will be   calculated using the Chronic Kidney Disease Epidemiology Collaboration   (CKD-EPI) equation.       Calcium 01/07/2021 9.0  8.5 - 10.1 mg/dL Final     Bilirubin Total 01/07/2021 0.4  0.2 - 1.3 mg/dL Final     Albumin 01/07/2021 3.3* 3.4 - 5.0 g/dL Final     Protein Total 01/07/2021 7.6  6.8 - 8.8 g/dL Final     Alkaline Phosphatase 01/07/2021 132  40 - 150 U/L Final     ALT 01/07/2021 63* 0 - 50 U/L Final     AST 01/07/2021 31  0 - 45 U/L Final       I spent a total of 10 minutes face-to-face with Sruthi Escobar during today's office visit.  Over 50% of this time was spent counseling the patient and/or coordinating care regarding concerns, charting and chart review.  See note for details.

## 2021-01-21 NOTE — PATIENT INSTRUCTIONS
[facility] Medication Refill Request Information:  * Please contact your pharmacy regarding ANY request for medication refills.  ** NP Clinic Prescription Fax = [phone]  * Please allow 3 business days for routine medication refills.  * Please allow 5 business days for controlled substance medication refills.     [facility] Test Result notification information:  *You will be notified with in 7-10 days of your appointment day regarding the results of your test.  If you are on MyChart you will be notified as soon as the provider has reviewed the results and signed off on them.    [facility]: [phone]     If you have questions regarding Covid-19 and the Covid-19 vaccine, please visit this website.    [email or web address]

## 2021-01-21 NOTE — NURSING NOTE
55 year old  Chief Complaint   Patient presents with     Recheck Medication     Follow up.       Blood pressure (!) 148/91, pulse 71, temperature 98.2  F (36.8  C), weight 96.2 kg (212 lb), SpO2 97 %, not currently breastfeeding. Body mass index is 36.46 kg/m .  BP completed using cuff size:      Laura Galaviz, KEITH  January 21, 2021 2:53 PM

## 2021-02-04 ENCOUNTER — TELEPHONE (OUTPATIENT)
Dept: ENDOCRINOLOGY | Facility: CLINIC | Age: 56
End: 2021-02-04

## 2021-02-04 NOTE — PROGRESS NOTES
"Sruthi Escobar is a 55 year old female who is being evaluated via a billable telephone visit.     The patient has been notified of following:     \"This telephone visit will be conducted via a call between you and your physician/provider. We have found that certain health care needs can be provided without the need for a physical exam.  This service lets us provide the care you need with a short phone conversation.  If a prescription is necessary we can send it directly to your pharmacy.  If lab work is needed we can place an order for that and you can then stop by our lab to have the test done at a later time.    Telephone visits are billed at different rates depending on your insurance coverage. During this emergency period, for some insurers they may be billed the same as an in-person visit.  Please reach out to your insurance provider with any questions.    If during the course of the call the physician/provider feels a telephone visit is not appropriate, you will not be charged for this service.\"    Patient has given verbal consent for Telephone visit?  Yes    What phone number would you like to be contacted at? [phone]    How would you like to obtain your AVS? Media[employer]    Phone call duration: 23 minutes    During this virtual visit the patient is located in [place], patient verifies this as the location during the entirety of this visit.       New Weight Management Nutrition Consultation    Sruthi Escobar is a 55 year old female presents today for new weight management nutrition consultation.  Patient referred by Dr. Nguyen on February 5, 2021.    Patient with Co-morbidities of obesity including:  Type II DM no  Renal Failure no  Sleep apnea no  Hypertension yes   Dyslipidemia no  Joint pain no  Back pain no  GERD no   Prediabetes yes    Anthropometrics:  Estimated body mass index is 37.2 kg/m  as calculated from the following:    Height as of an earlier encounter on 2/5/21: 1.6 m (5' 3\").    Weight " as of an earlier encounter on 2/5/21: 95.3 kg (210 lb).     Weight gain of 30 lbs in the past year.    Medications for Weight Loss:  None    NUTRITION HISTORY  See MD note for details.  NKFA. Does not tolerate OJ, acidic foods d/t reflux.   Previous wt loss attempts: Long ago had been able to lose weight on exercise exclusively. Tried cutting-out sweets, bread, adding more veggies over past couple months, but not seen much wt loss.     Recent food recall:  Breakfast: 9-10 am 2 boiled eggs; oatmeal with milk and sugar/raisins with tea   Lunch: 2 pm - salad with chicken, ranch, cheese  Dinner: 7 pm - 2 cups wild rice soup; baked chicken with plantain or potato; maybe meat and crackers  Snacks: irregular - cashews/almonds (not measuring serving), banana, orange   Beverages: tea (unsweet), water   Alcohol: rarely  Dining out: only on special occ    Physical Activity:  Has been going to the gym, 3-4 times per week for 45-60 mins. Group fitness classes, biking, steps.     Nutrition Prescription  Recommended energy/nutrient modification.  1200 calories/day (per MD)    Nutrition Diagnosis  Obesity r/t long history of self-monitoring deficit and excessive energy intake aeb BMI >30.    Nutrition Intervention  Materials/education provided on 1200 calorie/day diet, Volumetric eating to help satiety level on fewer calories; portion control and healthy food choices (Plate Method and Volumetrics handouts), and 100 calorie snack choices. Pt inquiring about use of intermittent fasting, discussed benefit of eating meals within an 8 hours window for wt loss. Provided pt with list of goals and handouts via [employer].    Patient demonstrates understanding.    Expected Engagement: good  Follow-Up Plans: Meal planning, food log     Nutrition Goals  1) Follow 1200 calorie/day plan. Use an angel like EMUZEPal, Lose It, Noom, or Cronometer to help you count calories.  2) Use the Plate Method to structure your meals  3) Eat within an 8 hour  window each day (Ex. 10 am - 6 pm).    The Plate Method  [email or web address]    Protein Sources for Weight Loss  [email or web address]     Carbohydrates  [email or web address]     Mindful Eating  [email or web address]     Summary of Volumetrics Eating Plan  [email or web address]     Follow-Up:  1 month, JAYJAY Simpson RD, LD

## 2021-02-05 ENCOUNTER — VIRTUAL VISIT (OUTPATIENT)
Dept: ENDOCRINOLOGY | Facility: CLINIC | Age: 56
End: 2021-02-05
Payer: COMMERCIAL

## 2021-02-05 ENCOUNTER — TELEPHONE (OUTPATIENT)
Dept: ENDOCRINOLOGY | Facility: CLINIC | Age: 56
End: 2021-02-05

## 2021-02-05 ENCOUNTER — VIRTUAL VISIT (OUTPATIENT)
Dept: ENDOCRINOLOGY | Facility: CLINIC | Age: 56
End: 2021-02-05
Attending: NURSE PRACTITIONER
Payer: COMMERCIAL

## 2021-02-05 VITALS — BODY MASS INDEX: 37.21 KG/M2 | WEIGHT: 210 LBS | HEIGHT: 63 IN

## 2021-02-05 DIAGNOSIS — R53.82 CHRONIC FATIGUE: ICD-10-CM

## 2021-02-05 DIAGNOSIS — Z71.3 NUTRITIONAL COUNSELING: ICD-10-CM

## 2021-02-05 DIAGNOSIS — E66.812 CLASS 2 SEVERE OBESITY WITH SERIOUS COMORBIDITY AND BODY MASS INDEX (BMI) OF 37.0 TO 37.9 IN ADULT, UNSPECIFIED OBESITY TYPE (H): ICD-10-CM

## 2021-02-05 DIAGNOSIS — I10 HYPERTENSION, UNSPECIFIED TYPE: ICD-10-CM

## 2021-02-05 DIAGNOSIS — E66.9 OBESITY: Primary | ICD-10-CM

## 2021-02-05 DIAGNOSIS — E65 CENTRAL ADIPOSITY: ICD-10-CM

## 2021-02-05 DIAGNOSIS — R73.03 PREDIABETES: ICD-10-CM

## 2021-02-05 DIAGNOSIS — E04.0 SIMPLE GOITER: Primary | ICD-10-CM

## 2021-02-05 DIAGNOSIS — E66.01 CLASS 2 SEVERE OBESITY WITH SERIOUS COMORBIDITY AND BODY MASS INDEX (BMI) OF 37.0 TO 37.9 IN ADULT, UNSPECIFIED OBESITY TYPE (H): ICD-10-CM

## 2021-02-05 DIAGNOSIS — E89.0 S/P PARTIAL THYROIDECTOMY: ICD-10-CM

## 2021-02-05 PROCEDURE — 97802 MEDICAL NUTRITION INDIV IN: CPT | Mod: TEL | Performed by: DIETITIAN, REGISTERED

## 2021-02-05 PROCEDURE — 99244 OFF/OP CNSLTJ NEW/EST MOD 40: CPT | Mod: 95 | Performed by: INTERNAL MEDICINE

## 2021-02-05 RX ORDER — NALTREXONE HYDROCHLORIDE AND BUPROPION HYDROCHLORIDE 8; 90 MG/1; MG/1
TABLET, EXTENDED RELEASE ORAL
Qty: 120 TABLET | Refills: 3 | Status: SHIPPED | OUTPATIENT
Start: 2021-02-05

## 2021-02-05 ASSESSMENT — PAIN SCALES - GENERAL: PAINLEVEL: NO PAIN (0)

## 2021-02-05 ASSESSMENT — MIFFLIN-ST. JEOR: SCORE: 1506.68

## 2021-02-05 NOTE — PATIENT INSTRUCTIONS
"Nice to talk with you today in virtual clinic.    Please go to any [facility] lab for e/m thyroid & cushings. Follow standard safety precautions for COVID-19, practice social isolation, hand hygiene, do not touch your face, nose, or mouth, wear mask if have to go out in public to be respectful of community.  Please contact us to schedule at any of our [facility] lab locations. Call [phone] ([phone]), select option [phone].    Please see nutritionist for DASH diet and low shawn instructions    Please go to [email or web address] to register as a patient to guarantee the lowest price for the drug. Click on How To Save link & choose sign up through local pharmacy & print that registration & take it to your pharmacy.    Start Contrave (combination of Wellbutrin & Naltrexone) as directed, for final dose of 2 tabs bid.  1 tab daily by mouth x wk#1, then increase to 1 tab am + 1 tab pm x wk #2. Then, 2 tabs am + 1 tab keshawn wk #3, then 2 tabs am + 2 tabs pm    1,000 calorie meal plan to lose 1 lbs weekly without exercise (based on REE calc of 1,507)  Ht 1.6 m (5' 3\")   Wt 95.3 kg (210 lb)   BMI 37.20 kg/m      Use meal replacements such as Celestina's meals, Lean Cuisines, Healthy Choice, Smart Ones, Weight Watchers Meals, and Slim Fast and Glucerna shakes and supplement with fresh fruits and vegetables  Please drink a lot of water daily. Most people typically need about 2 liters of water daily and more if they are exercising, have a large weight, or have a fever or illness. Add Crystal Light for flavoring if desired. But no pop with calories in it.  Please keep a food journal of what you eat, calories in what you eat  Consider using applications for smart phones such as [employer], LifeWhere's Up, 23pressRecipes, LoseIt, Tap&Track, and RelaxM.  Focus on wet volumetrics, meaning, eat more foods that are high in water and fiber such as fruits and vegetables in order to get that full feeling. These are also good for your overall " health as well.  Check out Dr. Radha Parikh from [facility] - she has cookbooks with low calorie volumetric recipes  You can try Let's Dish to help you prepare meals for you and your family. Often times, the caloric and nutrition data and serving sizes are available for this food. This can be a time saving maneuver. The website can give you more information http://www.ACTIVE Network.Knip/  Check out Hello Fresh at https://www.[employer].com/food-boxes/classic-box/  Try Cooking Light recipes for low calorie meal preparation and planning  Other food plan options you can search for on the internet and check out include: Noe ROSE, [facility]    CVD screening: please call [phone] for appointment at [facility] for Cardiovascular Disease Prevention  Then, after above screening, Progressively increase physical activity to 60 minutes, including combination of cardio & resistance training x 5 days weekly. And consider the [facility] at multiple locations, call [phone] for more information. You can also consider the [place] [facility] to get an exercise assessment and recommendation. You can either plan to complete the entire program there or take your new skills to the gym of your choice. For scheduling the [place] [facility] program, please call our clinic nurse at [phone]. Consider hiring a  to develop a structured progressive workout plan that includes both cardio and resistance training. Obesity is a disease according to the IRS, so you may be able to use some pre-tax dollars from your medical flexible spending account if you get a letter from your doctor and/or fill out a plan-specific form.    Please consider health psychologist to discuss how mood, depression and/or anxiety impact your eating.    Psychological Providers    Check with your insurance to see if the psychologist is covered, if not, ask your insurance company for a referral.    [place]  [facility]   Jo Ann Baldwin, PhD, LP   [phone]  Chaya Bhat, PhD, LP  [phone]  Veena Lazcano, PhD                 [phone]    Abdoulaye Gerardo, Psy,LP             [phone]    [place]  Corie Gibbs, PhD, LP  [phone]      [facility]:           [phone]  We send a referral and patient is notified.    [place]  [facility]:  [phone]  Celestina Waldron, PsyD, [facility]  Lesly Lynch, PSyD, LP         [phone]    [facility]   To schedule, please call member services on the back of your card.    DEBORA Johnson, PsyD,LP,ABPP [phone]    [place]  Eddie Cm MA, [phone]    [place]  Emilie Davis PsyD, [phone]    [place]  Cris Glasgow PsyD, [phone]  (fibromyalgia issues)    CHARLES Garcia  [phone]    [place]  Palak Mandujano,PsyD,LP  [phone]  Liu Zamarripa, PhD, LP  [phone]  Liu Hannah, PhD, LP             [phone]    [place]  Kyaw Crowley , [facility], LMFT [phone]    [place]  Ruth Ibanez, PsyD, [phone]    CHARLES Rabago PsyD, LP   [phone]     [place]      [facility] [phone]   Alyce Grimm MA, LP  - ext. [phone]  Liu Rincon, PhD, LP - ext [phone]  Andrea Roger, JuanyD, LP - ext [phone]    [place]  Cesar Salter, PhD, [phone]  Alec Damon, PsyD, [phone]          [place]  Nahed Tinajero, PhD, [phone]    RembertIsrael Echeverria PsyD,   [phone]            Call updates to KULWINDER Garcia    [phone]  Last updated: 2019    RTC: quarterly    Best Wishes,  Dr. Bertha Nguyen MD, MPH  Endocrinologist

## 2021-02-05 NOTE — PROGRESS NOTES
"    New Medical Weight Management Consult    PATIENT:  Sruthi Escobar  MRN:         [id number]  :         1965  NATA:         2021    Dear Colleagues,    I had the pleasure of seeing your patient, Sruthi Escobar. Full intake/assessment was done to determine barriers to weight loss success and develop a treatment plan. Sruthi Escobar is a 55 year old female interested in treatment of medical problems associated with excess weight. She has a height of 5' 3\", a weight of 210 lbs 0 oz, and the calculated Body mass index is 37.2 kg/m .    ASSESSMENT/PLAN:  Obesity w/ the following LSB (see survey & added data below) +  Endocrine evaluation - see orders  Body mass index is 37.2 kg/m .      Is taking metformin and is wanting to try medication along w/ diet. Her A1c is 6% pre-diabetes, no FH of DM. She wasn't able to get the GLP-1 agonist yet . . . Victoza.  Has element of metabolic misperception. She wonders if she has a problem with her metabolism or pancreas that is causing her weight. She has not had any testing for cortisol. has had ungal infection. She is very tired and has to push herself.  She wants medical causes ruled out - cushings, thyroid (has a h/o \"simple goiter\" on her chart). Had a partial thyroidectomy for goiter??? At [facility] in .   Denies striae + central adiposity + prominent dorsocervical background    CVA: -FH, -DM, +HTN (managed w/ hydrochlorothiazide, zestril), -Lipids, female (not male >60 years of age).  Denies chest pain and SOB. No CVD issues.    Patient Instructions:    Nice to talk with you today in virtual clinic.    Please go to any [place] lab for e/m thyroid & cushings. Follow standard safety precautions for COVID-19, practice social isolation, hand hygiene, do not touch your face, nose, or mouth, wear mask if have to go out in public to be respectful of community.  Please contact us to schedule at any of our [facility] lab locations. Call " "[phone] ([phone]), select option 1.    Please see nutritionist for DASH diet and low shawn instructions    Please go to [email or web address] to register as a patient to guarantee the lowest price for the drug. Click on How To Save link & choose sign up through local pharmacy & print that registration & take it to your pharmacy.    Start Contrave (combination of Wellbutrin & Naltrexone) as directed, for final dose of 2 tabs bid.  1 tab daily by mouth x wk#1, then increase to 1 tab am + 1 tab pm x wk #2. Then, 2 tabs am + 1 tab keshawn wk #3, then 2 tabs am + 2 tabs pm    1,000 calorie meal plan to lose 1 lbs weekly without exercise (based on REE calc of 1,507)  Ht 1.6 m (5' 3\")   Wt 95.3 kg (210 lb)   BMI 37.20 kg/m      Use meal replacements such as Celestina's meals, Lean Cuisines, Healthy Choice, Smart Ones, Weight Watchers Meals, and Slim Fast and Glucerna shakes and supplement with fresh fruits and vegetables  Please drink a lot of water daily. Most people typically need about 2 liters of water daily and more if they are exercising, have a large weight, or have a fever or illness. Add Crystal Light for flavoring if desired. But no pop with calories in it.  Please keep a food journal of what you eat, calories in what you eat  Consider using applications for smart phones such as Talentwire, DerbyJackpot, WaremakersRecipes, LoseIt, Tap&Track, and RelaxM.  Focus on wet volumetrics, meaning, eat more foods that are high in water and fiber such as fruits and vegetables in order to get that full feeling. These are also good for your overall health as well.  Check out Dr. Radha Parikh from [facility] - she has cookbooks with low calorie volumetric recipes  You can try Let's Dish to help you prepare meals for you and your family. Often times, the caloric and nutrition data and serving sizes are available for this food. This can be a time saving maneuver. The website can give you more information http://www.Responsa.[employer]/  Check out " TastingRoom.com at https://www.Bitfury Group/food-boxes/classic-box/  Try Cooking Light recipes for low calorie meal preparation and planning  Other food plan options you can search for on the internet and check out include: Noe ROSE, [facility]    CVD screening: please call [phone] for appointment at [facility] for Cardiovascular Disease Prevention  Then, after above screening, Progressively increase physical activity to 60 minutes, including combination of cardio & resistance training x 5 days weekly. And consider the [place] [facility] at multiple locations, call [phone] for more information. You can also consider the [facility] to get an exercise assessment and recommendation. You can either plan to complete the entire program there or take your new skills to the gym of your choice. For scheduling the [facility] program, please call our clinic nurse at [phone]. Consider hiring a  to develop a structured progressive workout plan that includes both cardio and resistance training. Obesity is a disease according to the IRS, so you may be able to use some pre-tax dollars from your medical flexible spending account if you get a letter from your doctor and/or fill out a plan-specific form.    Please consider health psychologist to discuss how mood, depression and/or anxiety impact your eating.    Psychological Providers    Check with your insurance to see if the psychologist is covered, if not, ask your insurance company for a referral.    [facility]   Jo Ann Baldwin, PhD, LP   [phone]  Chaya Bhat, PhD, LP  [phone]  Veena Lazcano, PhD                 [phone]    Abdoulaye Gerardo, Juany,LP             [phone]    [facility]  Corie Gibbs, PhD, LP  [phone]      [facility] Mental Health Service:           [phone]  We send a referral and patient is notified.    [facility] Short  in Psychiatry & Psychology:  [phone]  Celestina Waldron PsyD, [facility]  Lesly Lynch, PSyD, LP         [phone]    [facility]   To schedule, please call member services on the back of your card.    DEBORA Johnson, PsyD,LP,ABPP [phone]    [place]  Eddie Cm MA, [phone]    [place]  Emilie Davis PsyD, [phone]    [place]  Cris Glasgow, PsyD, [phone]  (fibromyalgia issues)    CHARLES Garcia  [phone]    [place]  Palak Mandujano,PsyD,LP  [phone]  Liu Zamarripa, PhD, LP  [phone]  Liu Hannah, PhD, LP             [phone]    [place]  Kyaw Crowley , [facility], [phone]    [place]  Ruth Ibanez, PsyD, [phone]    [place]  Prema Zamora, PsyD, LP   [phone]     [place]      Outreach [phone]   Alyce Grimm MA, LP  - ext. [phone]  Liu Rincon, PhD, LP - ext [phone]  Andrea Roger PsyD, LP - ext [phone]    [place]  Cesar Salter, PhD, [phone]  Alec Damon PsyD, [phone]          [place]  Nahed Tinajero, PhD, [phone]    KenmarIsrael Echeverria PsyD, LP  [phone]            Call updates to KULWINDER Garcia    [phone]  Last updated: 2019    RTC: quarterly    Best Wishes,  Dr. Bertha Nguyen MD, MPH  Endocrinologist      She has the following co-morbidities:       2021   I have the following health issues associated with obesity: None of the above   I have the following symptoms associated with obesity: None of the above       Patient Goals 2021   I am interested in having a healthier weight to diminish current health problems: No   I am interested in having a healthier weight in order to prevent future health problems: Yes   I am interested in having a healthier weight in order to have a future surgery: No       Referring Provider 2021   Please name the provider who referred you to Medical Weight  "Management.  If you do not know, please answer: \"I Don't Know\". Don't remember       Weight History 2/5/2021   How concerned are you about your weight? Very Concerned   Would you describe your weight gain as gradual? Yes   I became overweight: In College   The following factors have contributed to my weight gain:  Other   Please list the other factors.  brett gland she thinks   I have tried the following methods to lose weight: Exercise   My lowest weight since age 16 was: 115   My highest weight since age 16 was: 210   The most weight I have ever lost was: (lbs) 10   Has anyone in your family had weight loss surgery? No   How has your weight changed over the last year?  Gained   How many pounds? 30       Diet Recall Review with Patient 2/5/2021   Do you typically eat breakfast? Yes   If you do eat breakfast, what types of food do you eat? egg or oat meal tea   Do you typically eat lunch? Yes   If you do eat lunch, what types of food do you typically eat?  salad or chicken   Do you typically eat supper? Yes   If you do eat supper, what types of food do you typically eat? soup chicken maybe crackers maeat   Do you typically eat snacks? No   Do you like vegetables?  Yes   Do you drink water? Yes   How many glasses of juice do you drink in a typical day? 2   How many of glasses of milk do you drink in a typical day? 0   How many 8oz glasses of sugar containing drinks such as Angus-Aid/sweet tea do you drink in a day? 1   How many cans/bottles of sugar pop/soda/tea/sports drinks do you drink in a day? 1   How many cans/bottles of diet pop/soda/tea or sports drink do you drink in a day? 1   How often do you have a drink of alcohol? Never       Eating Habits 2/5/2021   Generally, my meals include foods like these: bread, pasta, rice, potatoes, corn, crackers, sweet dessert, pop, or juice. Once a Week   Generally, my meals include foods like these: fried meats, brats, burgers, french fries, pizza, cheese, chips, or ice " cream. A Few Times a Week   Eat fast food (like McDonalds, Burwufoo Jhonny, Taco Bell). Less Than Weekly   Eat at a buffet or sit-down restaurant. Less Than Weekly   Eat most of my meals in front of the TV or computer. Everyday   Often skip meals, eat at random times, have no regular eating times. Everyday   Rarely sit down for a meal but snack or graze throughout.  Never   Eat extra snacks between meals. Never   Eat most of my food at the end of the day. Everyday   Eat in the middle of the night or wake up at night to eat. Never   Eat extra snacks to prevent or correct low blood sugar. Never   Eat to prevent acid reflux or stomach pain. Never   Worry about not having enough food to eat. Never   Have you been to the food shelf at least a few times this year? No   I eat when I am depressed. Never   I eat when I am stressed. Never   I eat when I am bored. Never   I eat when I am anxious. Never   I eat when I am happy or as a reward. Never   I feel hungry all the time even if I just have eaten. Never   Feeling full is important to me. Never   I finish all the food on my plate even if I am already full. Never   I can't resist eating delicious food or walk past the good food/smell. Never   I eat/snack without noticing that I am eating. Never   I eat when I am preparing the meal. Never   I eat more than usual when I see others eating. Never   I have trouble not eating sweets, ice cream, cookies, or chips if they are around the house. Never   I think about food all day. Never   Please list any other foods you crave? rice       Amount of Food 2/5/2021   I make myself vomit what I have eaten or use laxatives to get rid of food. Never   I eat a large amount of food, like a loaf of bread, a box of cookies, a pint/quart of ice cream, all at once. Never   I eat a large amount of food even when I am not hungry. Never   I eat rapidly. Never   I eat alone because I feel embarrassed and do not want others to see how much I have eaten.  Never   I eat until I am uncomfortably full. Never   I feel bad, disgusted, or guilty after I overeat. Never   I make myself vomit what I have eaten or use laxatives to get rid of food. Never       Activity/Exercise History 2/5/2021   How much of a typical 12 hour day do you spend sitting? Half the Day   How much of a typical 12 hour day do you spend lying down? Less Than Half the Day   How much of a typical day do you spend walking/standing? Less Than Half the Day   How many hours (not including work) do you spend on the TV/Video Games/Computer/Tablet/Phone? 1 Hour or Less   How many times a week are you active for the purpose of exercise? 2-3 Times a Week   What keeps you from being more active? Lack of Time   How many total minutes do you spend doing some activity for the purpose of exercising when you exercise? More Than 30 Minutes       PAST MEDICAL HISTORY:  Past Medical History:   Diagnosis Date     Anemia      Bronchiectasis (H)      Chronic tonsillitis      Gastroesophageal reflux disease      Hypertension      Migraines      Pain in shoulder region, left      Thyroid disease        Work/Social History Reviewed With Patient 2/5/2021   My employment status is: Full-Time   My job is: NP   How much of your job is spent on the computer or phone? 75%   How many hours do you spend commuting to work daily?  40 mins   What is your marital status? Single   If in a relationship, is your significant other overweight? N/A   Do you have children? Yes   If you have children, are they overweight? No   Who do you live with?  no   Are they supportive of your health goals? Yes   Who does the food shopping?  you       Mental Health History Reviewed With Patient 2/5/2021   Have you ever been physically or sexually abused? No   If yes, do you feel that the abuse is affecting your weight? N/A   If yes, would you like to talk to a counselor about the abuse? N/A   How often in the past 2 weeks have you felt little interest or  pleasure in doing things? Not at all   Over the past 2 weeks how often have you felt down, depressed, or hopeless? Not at all       Sleep History Reviewed With Patient 2/5/2021   How many hours do you sleep at night? 6   Do you think that you snore loudly or has anybody ever heard you snore loudly (louder than talking or so loud it can be heard behind a shut door)? No   Has anyone seen or heard you stop breathing during your sleep? No   Do you often feel tired, fatigued, or sleepy during the day? No   Do you have a TV/Computer in your bedroom? No       MEDICATIONS:   Current Outpatient Medications   Medication Sig Dispense Refill     acetaminophen (TYLENOL) 325 MG tablet Take 325-650 mg by mouth every 6 hours as needed for mild pain (PT last dose approx 2 weeks ago 1.15.2020)       albuterol (PROAIR HFA/PROVENTIL HFA/VENTOLIN HFA) 108 (90 Base) MCG/ACT Inhaler Inhale 2 puffs into the lungs every 6 hours 1 Inhaler 3     calcium-vitamin D (CALTRATE) 600-400 MG-UNIT per tablet Take 1 tablet by mouth as needed (PT last dose a week ago 1.15.2020)        cevimeline (EVOXAC) 30 MG capsule Take 1 capsule (30 mg) by mouth 3 times daily Can also take once or twice daily. 150 capsule 3     ferrous sulfate ([employer]S IRON) 325 (65 Fe) MG tablet Take 325 mg by mouth as needed (PT last dose a week ago 1.15.2020)        fluconazole (DIFLUCAN) 100 MG tablet Take 2 tablets the first day, then 100mg orally daily x 14 days 14 tablet 0     hydrochlorothiazide (MICROZIDE) 12.5 MG capsule Take 1 capsule (12.5 mg) by mouth daily (Patient taking differently: Take 12.5 mg by mouth every other day ) 90 capsule 3     insulin pen needle (31G X 5 MM) 31G X 5 MM miscellaneous Use 1 pen needles daily or as directed. 90 each 1     liraglutide (VICTOZA) 18 MG/3ML solution Inject 0.6 mg Subcutaneous daily 3 mL 0     lisinopril (ZESTRIL) 30 MG tablet Take 1 tablet (30 mg) by mouth daily 90 tablet 0     Multiple Vitamin (MULTIVITAMIN  S) CAPS Take 1 capsule  by mouth as needed (PT last dose a week ago 1.15.2020)        miconazole (MICATIN) 2 % external cream Apply topically 2 times daily (Patient not taking: Reported on 2/5/2021) 198 g 3     miconazole (MICATIN) 2 % external powder Apply topically as needed for itching or other (as needed once daily) (Patient not taking: Reported on 2/5/2021) 71 g 3       ALLERGIES:   Allergies   Allergen Reactions     Prochlorperazine Anxiety and Itching     Phenothiazines Rash       PHYSICAL EXAM:  There were no vitals taken for this virtual visit.  Gen: calm, nad, pleasant and conversant  Neuro: A&O     COVID-19 PANDEMIC PROTOCOL. VISIT HAD TO BE CONVERTED TO PHONE VISIT DUE TO TECHNICAL ISSUES ON PATIENT'S END, SO PHYSICIAN CALLED PATIENT.    Phone call duration: 28 min 20 seconds      Total Time: 60 min spent on extensive chart review including outside records, evaluation, management, counseling, education, & motivational interviewing with greater than 50% of the total time was spent on counseling and coordinating care and documentation

## 2021-02-05 NOTE — LETTER
"2/5/2021       RE: Sruthi Escobar  [address] [address]     Dear Colleague,    Thank you for referring your patient, Sruthi Escobar, to the [facility]IGHT [facility]NAG[facility] [place] [facility]. Please see a copy of my visit note below.    Sruthi Escobar is a 55 year old female who is being evaluated via a billable telephone visit.     The patient has been notified of following:     \"This telephone visit will be conducted via a call between you and your physician/provider. We have found that certain health care needs can be provided without the need for a physical exam.  This service lets us provide the care you need with a short phone conversation.  If a prescription is necessary we can send it directly to your pharmacy.  If lab work is needed we can place an order for that and you can then stop by our lab to have the test done at a later time.    Telephone visits are billed at different rates depending on your insurance coverage. During this emergency period, for some insurers they may be billed the same as an in-person visit.  Please reach out to your insurance provider with any questions.    If during the course of the call the physician/provider feels a telephone visit is not appropriate, you will not be charged for this service.\"    Patient has given verbal consent for Telephone visit?  Yes    What phone number would you like to be contacted at? [phone]    How would you like to obtain your AVS? [email or web address]    Phone call duration: 23 minutes    During this virtual visit the patient is located in [place], patient verifies this as the location during the entirety of this visit.       New Weight Management Nutrition Consultation    Sruthi Escobar is a 55 year old female presents today for new weight management nutrition consultation.  Patient referred by Dr. Nguyen on February 5, 2021.    Patient with Co-morbidities of " "obesity including:  Type II DM no  Renal Failure no  Sleep apnea no  Hypertension yes   Dyslipidemia no  Joint pain no  Back pain no  GERD no   Prediabetes yes    Anthropometrics:  Estimated body mass index is 37.2 kg/m  as calculated from the following:    Height as of an earlier encounter on 2/5/21: 1.6 m (5' 3\").    Weight as of an earlier encounter on 2/5/21: 95.3 kg (210 lb).     Weight gain of 30 lbs in the past year.    Medications for Weight Loss:  None    NUTRITION HISTORY  See MD note for details.  NKFA. Does not tolerate OJ, acidic foods d/t reflux.   Previous wt loss attempts: Long ago had been able to lose weight on exercise exclusively. Tried cutting-out sweets, bread, adding more veggies over past couple months, but not seen much wt loss.     Recent food recall:  Breakfast: 9-10 am 2 boiled eggs; oatmeal with milk and sugar/raisins with tea   Lunch: 2 pm - salad with chicken, ranch, cheese  Dinner: 7 pm - 2 cups wild rice soup; baked chicken with plantain or potato; maybe meat and crackers  Snacks: irregular - cashews/almonds (not measuring serving), banana, orange   Beverages: tea (unsweet), water   Alcohol: rarely  Dining out: only on special occ    Physical Activity:  Has been going to the gym, 3-4 times per week for 45-60 mins. Group fitness classes, biking, steps.     Nutrition Prescription  Recommended energy/nutrient modification.  1200 calories/day (per MD)    Nutrition Diagnosis  Obesity r/t long history of self-monitoring deficit and excessive energy intake aeb BMI >30.    Nutrition Intervention  Materials/education provided on 1200 calorie/day diet, Volumetric eating to help satiety level on fewer calories; portion control and healthy food choices (Plate Method and Volumetrics handouts), and 100 calorie snack choices. Pt inquiring about use of intermittent fasting, discussed benefit of eating meals within an 8 hours window for wt loss. Provided pt with list of goals and handouts via " Mark.    Patient demonstrates understanding.    Expected Engagement: good  Follow-Up Plans: Meal planning, food log     Nutrition Goals  1) Follow 1200 calorie/day plan. Use an angel like MyfitnessPal, Lose It, Noom, or Cronometer to help you count calories.  2) Use the Plate Method to structure your meals  3) Eat within an 8 hour window each day (Ex. 10 am - 6 pm).    The Plate Method  [email or web address]    Protein Sources for Weight Loss  [email or web address]     Carbohydrates  [email or web address]     Mindful Eating  [email or web address]     Summary of Volumetrics Eating Plan  [email or web address]     Follow-Up:  1 month, JAYJAY Simpson RD, LD

## 2021-02-05 NOTE — PROGRESS NOTES
Sruthi is a 55 year old who is being evaluated via a billable video visit.      How would you like to obtain your AVS? [email or web address]  If the video visit is dropped, the invitation should be resent by: Text to cell phone: [phone]  Will anyone else be joining your video visit? No

## 2021-02-05 NOTE — NURSING NOTE
"Chief Complaint   Patient presents with     Consult     Consulation Weight Management       Vitals:    02/05/21 0725   Weight: 95.3 kg (210 lb)   Height: 1.6 m (5' 3\")       Body mass index is 37.2 kg/m .                            "

## 2021-02-05 NOTE — LETTER
"2021       RE: Sruthi Escobar  [address] [facility] [address]     Dear Colleague,    Thank you for referring your patient, Sruthi Escobar, to the [facility] WEIGHT MANAGEMENT [facility]IN[facility]. Please see a copy of my visit note below.    Sruthi is a 55 year old who is being evaluated via a billable video visit.      How would you like to obtain your AVS? MyChart  If the video visit is dropped, the invitation should be resent by: Text to cell phone: [phone]  Will anyone else be joining your video visit? No      New Medical Weight Management Consult    PATIENT:  Sruthi Escobar  MRN:         [id number]  :         1965  NATA:         2021    Dear Colleagues,    I had the pleasure of seeing your patient, Sruthi Escobar. Full intake/assessment was done to determine barriers to weight loss success and develop a treatment plan. Sruthi Escobar is a 55 year old female interested in treatment of medical problems associated with excess weight. She has a height of 5' 3\", a weight of 210 lbs 0 oz, and the calculated Body mass index is 37.2 kg/m .    ASSESSMENT/PLAN:  Obesity w/ the following LSB (see survey & added data below) +  Endocrine evaluation - see orders  Body mass index is 37.2 kg/m .      Is taking metformin and is wanting to try medication along w/ diet. Her A1c is 6% pre-diabetes, no FH of DM. She wasn't able to get the GLP-1 agonist yet . . . Victoza.  Has element of metabolic misperception. She wonders if she has a problem with her metabolism or pancreas that is causing her weight. She has not had any testing for cortisol. has had ungal infection. She is very tired and has to push herself.  She wants medical causes ruled out - cushings, thyroid (has a h/o \"simple goiter\" on her chart). Had a partial thyroidectomy for goiter??? At [place] in .   Denies striae + central adiposity + prominent " "dorsocervical background    CVA: -FH, -DM, +HTN (managed w/ hydrochlorothiazide, zestril), -Lipids, female (not male >60 years of age).  Denies chest pain and SOB. No CVD issues.    Patient Instructions:    Nice to talk with you today in virtual clinic.    Please go to any [facility] lab for e/m thyroid & cushings. Follow standard safety precautions for COVID-19, practice social isolation, hand hygiene, do not touch your face, nose, or mouth, wear mask if have to go out in public to be respectful of community.  Please contact us to schedule at any of our [facility] lab locations. Call [phone] ([phone]), select option [phone].    Please see nutritionist for DASH diet and low shawn instructions    Please go to [email or web address] to register as a patient to guarantee the lowest price for the drug. Click on How To Save link & choose sign up through local pharmacy & print that registration & take it to your pharmacy.    Start Contrave (combination of Wellbutrin & Naltrexone) as directed, for final dose of 2 tabs bid.  1 tab daily by mouth x wk#1, then increase to 1 tab am + 1 tab pm x wk #2. Then, 2 tabs am + 1 tab keshawn wk #3, then 2 tabs am + 2 tabs pm    1,000 calorie meal plan to lose 1 lbs weekly without exercise (based on REE calc of 1,507)  Ht 1.6 m (5' 3\")   Wt 95.3 kg (210 lb)   BMI 37.20 kg/m      Use meal replacements such as Celestina's meals, Lean Cuisines, Healthy Choice, Smart Ones, Weight Watchers Meals, and Slim Fast and Glucerna shakes and supplement with fresh fruits and vegetables  Please drink a lot of water daily. Most people typically need about 2 liters of water daily and more if they are exercising, have a large weight, or have a fever or illness. Add Crystal Light for flavoring if desired. But no pop with calories in it.  Please keep a food journal of what you eat, calories in what you eat  Consider using applications for smart phones such as Boyibang, CrowdPC, CCM BenchmarkRecipes, LoseIt, " Tap&Track, and RelaxM.  Focus on wet volumetrics, meaning, eat more foods that are high in water and fiber such as fruits and vegetables in order to get that full feeling. These are also good for your overall health as well.  Check out Dr. Radha Parikh from [facility] - she has cookbooks with low calorie volumetric recipes  You can try Let's Dish to help you prepare meals for you and your family. Often times, the caloric and nutrition data and serving sizes are available for this food. This can be a time saving maneuver. The website can give you more information http://www.Shift Media/  Check out Hello Fresh at https://www.Cloud SustainabilityloEptica.Health Plan One/food-boxes/classic-box/  Try Cooking Light recipes for low calorie meal preparation and planning  Other food plan options you can search for on the internet and check out include: Noe ROSE, [facility]    CVD screening: please call [phone] for appointment at [facility] for Cardiovascular Disease Prevention  Then, after above screening, Progressively increase physical activity to 60 minutes, including combination of cardio & resistance training x 5 days weekly. And consider the [facility] at multiple locations, call [phone] for more information. You can also consider the [place] [facility] to get an exercise assessment and recommendation. You can either plan to complete the entire program there or take your new skills to the gym of your choice. For scheduling the [place] [facility] program, please call our clinic nurse at [phone]. Consider hiring a  to develop a structured progressive workout plan that includes both cardio and resistance training. Obesity is a disease according to the IRS, so you may be able to use some pre-tax dollars from your medical flexible spending account if you get a letter from your doctor and/or fill out a plan-specific form.    Please consider health psychologist to discuss how  mood, depression and/or anxiety impact your eating.    Psychological Providers    Check with your insurance to see if the psychologist is covered, if not, ask your insurance company for a referral.    [facility]   Jo Ann Baldwin, PhD, LP   [phone]  Chaya Bhat, PhD, LP  [phone]  Veena Lazcano, PhD                 [phone]    Dennehotsosharmila Gerardo, Shery,LP             [phone]    [place]  Corie Gibbs, PhD, LP  [phone]      [facility]:           [phone]  We send a referral and patient is notified.    [place]  [facility]:  [phone]  Sher GarvinyD, [facility]  SHER JarayD, LP         [phone]    [facility]   To schedule, please call member services on the back of your card.    DEBORA Johnson PsyD,LP,[facility] [phone]    [place]  Eddie Cm MA, [phone]    [place]  Sher CárdenasyD, [phone]    [place]  Sher BradyyD, [phone]  (fibromyalgia issues)    CHARLES Garcia  [phone]    [place]  Sher ChandyD,LP  [phone]  Liu Zamarripa, PhD, LP  [phone]  Liu Hannah, PhD, LP             [phone]    [place]  Kyaw Crowley , [facility], LMFT [phone]    [place]  Ruth Ibanez, PsyD, [phone]    CHARLES Rabago, PsyD, LP   [phone]     [place]      Outreach [phone]   Alyce Grimm MA, LP  - ext. [phone]  Liu Rincon, PhD, LP - ext [phone]  Andrea Roger, Shelia, LP - ext [phone]    [place]  Cesar Salter, PhD, [phone]  Alec Damon PsyD, [phone]          [facility] [place] Tinajero, PhD, [phone]    [facility] [place] Echeverria PsyD, LP  [phone]            Call updates to KULWINDER Garcia    [phone]  Last updated: 2019    RTC: quarterly    Best Wishes,  Dr. Bertha Nguyen MD,  "MPH  Endocrinologist      She has the following co-morbidities:       2/5/2021   I have the following health issues associated with obesity: None of the above   I have the following symptoms associated with obesity: None of the above       Patient Goals 2/5/2021   I am interested in having a healthier weight to diminish current health problems: No   I am interested in having a healthier weight in order to prevent future health problems: Yes   I am interested in having a healthier weight in order to have a future surgery: No       Referring Provider 2/5/2021   Please name the provider who referred you to Medical Weight Management.  If you do not know, please answer: \"I Don't Know\". Don't remember       Weight History 2/5/2021   How concerned are you about your weight? Very Concerned   Would you describe your weight gain as gradual? Yes   I became overweight: In College   The following factors have contributed to my weight gain:  Other   Please list the other factors.  adernal gland she thinks   I have tried the following methods to lose weight: Exercise   My lowest weight since age 16 was: 115   My highest weight since age 16 was: 210   The most weight I have ever lost was: (lbs) 10   Has anyone in your family had weight loss surgery? No   How has your weight changed over the last year?  Gained   How many pounds? 30       Diet Recall Review with Patient 2/5/2021   Do you typically eat breakfast? Yes   If you do eat breakfast, what types of food do you eat? egg or oat meal tea   Do you typically eat lunch? Yes   If you do eat lunch, what types of food do you typically eat?  salad or chicken   Do you typically eat supper? Yes   If you do eat supper, what types of food do you typically eat? soup chicken maybe crackers maeat   Do you typically eat snacks? No   Do you like vegetables?  Yes   Do you drink water? Yes   How many glasses of juice do you drink in a typical day? 2   How many of glasses of milk do you drink in a " typical day? 0   How many 8oz glasses of sugar containing drinks such as Angus-Aid/sweet tea do you drink in a day? 1   How many cans/bottles of sugar pop/soda/tea/sports drinks do you drink in a day? 1   How many cans/bottles of diet pop/soda/tea or sports drink do you drink in a day? 1   How often do you have a drink of alcohol? Never       Eating Habits 2/5/2021   Generally, my meals include foods like these: bread, pasta, rice, potatoes, corn, crackers, sweet dessert, pop, or juice. Once a Week   Generally, my meals include foods like these: fried meats, brats, burgers, french fries, pizza, cheese, chips, or ice cream. A Few Times a Week   Eat fast food (like McDonalds, BurIntegralReach Jhonny, Taco Bell). Less Than Weekly   Eat at a buffet or sit-down restaurant. Less Than Weekly   Eat most of my meals in front of the TV or computer. Everyday   Often skip meals, eat at random times, have no regular eating times. Everyday   Rarely sit down for a meal but snack or graze throughout.  Never   Eat extra snacks between meals. Never   Eat most of my food at the end of the day. Everyday   Eat in the middle of the night or wake up at night to eat. Never   Eat extra snacks to prevent or correct low blood sugar. Never   Eat to prevent acid reflux or stomach pain. Never   Worry about not having enough food to eat. Never   Have you been to the food shelf at least a few times this year? No   I eat when I am depressed. Never   I eat when I am stressed. Never   I eat when I am bored. Never   I eat when I am anxious. Never   I eat when I am happy or as a reward. Never   I feel hungry all the time even if I just have eaten. Never   Feeling full is important to me. Never   I finish all the food on my plate even if I am already full. Never   I can't resist eating delicious food or walk past the good food/smell. Never   I eat/snack without noticing that I am eating. Never   I eat when I am preparing the meal. Never   I eat more than usual when  I see others eating. Never   I have trouble not eating sweets, ice cream, cookies, or chips if they are around the house. Never   I think about food all day. Never   Please list any other foods you crave? rice       Amount of Food 2/5/2021   I make myself vomit what I have eaten or use laxatives to get rid of food. Never   I eat a large amount of food, like a loaf of bread, a box of cookies, a pint/quart of ice cream, all at once. Never   I eat a large amount of food even when I am not hungry. Never   I eat rapidly. Never   I eat alone because I feel embarrassed and do not want others to see how much I have eaten. Never   I eat until I am uncomfortably full. Never   I feel bad, disgusted, or guilty after I overeat. Never   I make myself vomit what I have eaten or use laxatives to get rid of food. Never       Activity/Exercise History 2/5/2021   How much of a typical 12 hour day do you spend sitting? Half the Day   How much of a typical 12 hour day do you spend lying down? Less Than Half the Day   How much of a typical day do you spend walking/standing? Less Than Half the Day   How many hours (not including work) do you spend on the TV/Video Games/Computer/Tablet/Phone? 1 Hour or Less   How many times a week are you active for the purpose of exercise? 2-3 Times a Week   What keeps you from being more active? Lack of Time   How many total minutes do you spend doing some activity for the purpose of exercising when you exercise? More Than 30 Minutes       PAST MEDICAL HISTORY:  Past Medical History:   Diagnosis Date     Anemia      Bronchiectasis (H)      Chronic tonsillitis      Gastroesophageal reflux disease      Hypertension      Migraines      Pain in shoulder region, left      Thyroid disease        Work/Social History Reviewed With Patient 2/5/2021   My employment status is: Full-Time   My job is: NP   How much of your job is spent on the computer or phone? 75%   How many hours do you spend commuting to work  daily?  40 mins   What is your marital status? Single   If in a relationship, is your significant other overweight? N/A   Do you have children? Yes   If you have children, are they overweight? No   Who do you live with?  no   Are they supportive of your health goals? Yes   Who does the food shopping?  you       Mental Health History Reviewed With Patient 2/5/2021   Have you ever been physically or sexually abused? No   If yes, do you feel that the abuse is affecting your weight? N/A   If yes, would you like to talk to a counselor about the abuse? N/A   How often in the past 2 weeks have you felt little interest or pleasure in doing things? Not at all   Over the past 2 weeks how often have you felt down, depressed, or hopeless? Not at all       Sleep History Reviewed With Patient 2/5/2021   How many hours do you sleep at night? 6   Do you think that you snore loudly or has anybody ever heard you snore loudly (louder than talking or so loud it can be heard behind a shut door)? No   Has anyone seen or heard you stop breathing during your sleep? No   Do you often feel tired, fatigued, or sleepy during the day? No   Do you have a TV/Computer in your bedroom? No       MEDICATIONS:   Current Outpatient Medications   Medication Sig Dispense Refill     acetaminophen (TYLENOL) 325 MG tablet Take 325-650 mg by mouth every 6 hours as needed for mild pain (PT last dose approx 2 weeks ago 1.15.2020)       albuterol (PROAIR HFA/PROVENTIL HFA/VENTOLIN HFA) 108 (90 Base) MCG/ACT Inhaler Inhale 2 puffs into the lungs every 6 hours 1 Inhaler 3     calcium-vitamin D (CALTRATE) 600-400 MG-UNIT per tablet Take 1 tablet by mouth as needed (PT last dose a week ago 1.15.2020)        cevimeline (EVOXAC) 30 MG capsule Take 1 capsule (30 mg) by mouth 3 times daily Can also take once or twice daily. 150 capsule 3     ferrous sulfate (CVS IRON) 325 (65 Fe) MG tablet Take 325 mg by mouth as needed (PT last dose a week ago 1.15.2020)         fluconazole (DIFLUCAN) 100 MG tablet Take 2 tablets the first day, then 100mg orally daily x 14 days 14 tablet 0     hydrochlorothiazide (MICROZIDE) 12.5 MG capsule Take 1 capsule (12.5 mg) by mouth daily (Patient taking differently: Take 12.5 mg by mouth every other day ) 90 capsule 3     insulin pen needle (31G X 5 MM) 31G X 5 MM miscellaneous Use 1 pen needles daily or as directed. 90 each 1     liraglutide (VICTOZA) 18 MG/3ML solution Inject 0.6 mg Subcutaneous daily 3 mL 0     lisinopril (ZESTRIL) 30 MG tablet Take 1 tablet (30 mg) by mouth daily 90 tablet 0     Multiple Vitamin (MULTIVITAMIN  S) CAPS Take 1 capsule by mouth as needed (PT last dose a week ago 1.15.2020)        miconazole (MICATIN) 2 % external cream Apply topically 2 times daily (Patient not taking: Reported on 2/5/2021) 198 g 3     miconazole (MICATIN) 2 % external powder Apply topically as needed for itching or other (as needed once daily) (Patient not taking: Reported on 2/5/2021) 71 g 3       ALLERGIES:   Allergies   Allergen Reactions     Prochlorperazine Anxiety and Itching     Phenothiazines Rash       PHYSICAL EXAM:  There were no vitals taken for this virtual visit.  Gen: calm, nad, pleasant and conversant  Neuro: A&O     COVID-19 PANDEMIC PROTOCOL. VISIT HAD TO BE CONVERTED TO PHONE VISIT DUE TO TECHNICAL ISSUES ON PATIENT'S END, SO PHYSICIAN CALLED PATIENT.    Phone call duration: 28 min 20 seconds      Total Time: 60 min spent on extensive chart review including outside records, evaluation, management, counseling, education, & motivational interviewing with greater than 50% of the total time was spent on counseling and coordinating care and documentation

## 2021-02-05 NOTE — PATIENT INSTRUCTIONS
Prudencio Negro,    Follow-up with RD in one month.    Thank you,    Armida Simpson, RD, LD  If you would like to schedule or reschedule an appointment with the RD, please call [phone]    Nutrition Goals  1) Follow 1200 calorie/day plan. Use an angel like MyfitnessPal, Lose It, Noom, or Cronometer to help you count calories.  2) Use the Plate Method to structure your meals  3) Eat within an 8 hour window each day (Ex. 10 am - 6 pm).    The Plate Method  [email or web address]    Protein Sources for Weight Loss  [email or web address]     Carbohydrates  [email or web address]     Mindful Eating  [email or web address]     Summary of Volumetrics Eating Plan  [email or web address]     Interested in working with a health ?  Health coaches work with you to improve your overall health and wellbeing.  They look at the whole person, and may involve discussion of different areas of life, including, but not limited to the four pillars of health (sleep, exercise, nutrition, and stress management). Discuss with your care team if you would like to start working a health .    Health Coaching-3 Pack:    $99 for three health coaching visits    Visits may be done in person or via phone    Coaching is a partnership between the  and the client; Coaches do not prescribe or diagnose    Coaching helps inspire the client to reach his/her personal goals      Virtual Support Groups are Available             Healthy Lifestyle Support Group      All are welcome!     Facilitator: Yanely Sim, Certified Health     - Meets once a month on a Friday from 12:30pm to 1:30pm.  - Due to Covid-19 she is doing this Support Group virtually using Microsoft Teams.  - 60 minutes of small group guided discussion, support and resources.  - Please email Yanely directly at [email or web address] to receive monthly invitations.  - If you opt to participate in individual health coaching sessions or for general  questions, contact Yanely via email.  - Yanely will send out invites for each session, so the phone number and the conference ID may change for each session.     2020 Meetings      December 18 - Open Forum      2021 Meetings      January 29 - How to Stay Active and Healthy during the Winter Months   February 26  - Reading Food Labels: What do I Need to Know?, Guest Speaker: Armida Simpson RD   March 19  - Finding Health, Happiness and Confidence at Every Size; Guest Speaker, Health Psychologist Fellow  April 30 -  Healthy Eating on a Budget, Guest Speaker: Leyda Whatley RD   May 21 - Open Forum

## 2021-02-08 ENCOUNTER — TELEPHONE (OUTPATIENT)
Dept: ENDOCRINOLOGY | Facility: CLINIC | Age: 56
End: 2021-02-08

## 2021-02-08 NOTE — TELEPHONE ENCOUNTER
"Prior Authorization Retail Medication Request    Medication/Dose: Contrave  ICD code (if different than what is on RX):    Simple goiter [E04.0]  - Primary       S/P partial thyroidectomy [Z98.890]       Chronic fatigue [R53.82]       Prediabetes [R73.03]       Central adiposity [E65]       Class 2 severe obesity with serious comorbidity and body mass index (BMI) of 37.0 to 37.9 in adult, unspecified obesity type (H) [E66.01, Z68.37]           Previously Tried and Failed:  History of diet and exercise  Rationale:  Sruthi Escobar is a 55 year old female interested in treatment of medical problems associated with excess weight. She has a height of 5' 3\", a weight of 210 lbs 0 oz, and the calculated Body mass index is 37.2 kg/m . Is taking metformin and is wanting to try medication along w/ diet. Her A1c is 6% pre-diabetes, no FH of DM. She wasn't able to get the GLP-1 agonist yet . . . Victoza. CVA: -FH, -DM, +HTN (managed w/ hydrochlorothiazide, zestril), so Phentermine or other stimulants would not be a good option.     Insurance Name:    Insurance ID:        Pharmacy Information (if different than what is on RX)  Name:  [id number] [place], [place] - [address] [place] [address]  AT [facility] OF [address]  Phone:  [phone]  "

## 2021-02-09 NOTE — TELEPHONE ENCOUNTER
Central Prior Authorization Team   Phone: [phone]    PA Initiation    Medication: Contrave-PA initiated  Insurance Company: [employer] - Phone [phone] Fax [phone]  Pharmacy Filling the Rx: [facility] #[id number] [place], [place] - [address] [place] [address]  AT [facility] OF [address]  Filling Pharmacy Phone: [phone]  Filling Pharmacy Fax:    Start Date: 2/9/2021

## 2021-02-10 ENCOUNTER — TELEPHONE (OUTPATIENT)
Dept: ENDOCRINOLOGY | Facility: CLINIC | Age: 56
End: 2021-02-10

## 2021-02-10 NOTE — TELEPHONE ENCOUNTER
Reason for call:  Other   Patient called regarding (reason for call): prescription  Additional comments: Patient does not like the contrave prescribed and it is expensive, not covered by insurance, and spiked her blood pressure. She would like to go back to her metformin. She would like a call from MD Nguyen to discuss.     Phone number to reach patient:  Home number on file [phone] (home)    Best Time:  anytimme     Can we leave a detailed message on this number?  YES    Travel screening: Not Applicable

## 2021-02-10 NOTE — TELEPHONE ENCOUNTER
PRIOR AUTHORIZATION DENIED    Medication: Contrave-PA denied    Denial Date: 2/10/2021    Denial Rational:       Appeal Information:

## 2021-02-19 ENCOUNTER — OFFICE VISIT (OUTPATIENT)
Dept: URGENT CARE | Facility: URGENT CARE | Age: 56
End: 2021-02-19
Payer: COMMERCIAL

## 2021-02-19 VITALS
TEMPERATURE: 98.6 F | RESPIRATION RATE: 14 BRPM | DIASTOLIC BLOOD PRESSURE: 86 MMHG | OXYGEN SATURATION: 98 % | SYSTOLIC BLOOD PRESSURE: 138 MMHG | HEART RATE: 84 BPM

## 2021-02-19 DIAGNOSIS — E66.01 CLASS 2 SEVERE OBESITY WITH SERIOUS COMORBIDITY AND BODY MASS INDEX (BMI) OF 37.0 TO 37.9 IN ADULT, UNSPECIFIED OBESITY TYPE (H): ICD-10-CM

## 2021-02-19 DIAGNOSIS — E65 CENTRAL ADIPOSITY: ICD-10-CM

## 2021-02-19 DIAGNOSIS — J01.90 ACUTE SINUSITIS WITH SYMPTOMS > 10 DAYS: Primary | ICD-10-CM

## 2021-02-19 DIAGNOSIS — R73.03 PREDIABETES: ICD-10-CM

## 2021-02-19 DIAGNOSIS — E89.0 S/P PARTIAL THYROIDECTOMY: ICD-10-CM

## 2021-02-19 DIAGNOSIS — H65.92 MIDDLE EAR EFFUSION, LEFT: ICD-10-CM

## 2021-02-19 DIAGNOSIS — R53.82 CHRONIC FATIGUE: ICD-10-CM

## 2021-02-19 DIAGNOSIS — E66.812 CLASS 2 SEVERE OBESITY WITH SERIOUS COMORBIDITY AND BODY MASS INDEX (BMI) OF 37.0 TO 37.9 IN ADULT, UNSPECIFIED OBESITY TYPE (H): ICD-10-CM

## 2021-02-19 DIAGNOSIS — E04.0 SIMPLE GOITER: ICD-10-CM

## 2021-02-19 PROCEDURE — 84439 ASSAY OF FREE THYROXINE: CPT | Performed by: INTERNAL MEDICINE

## 2021-02-19 PROCEDURE — 84443 ASSAY THYROID STIM HORMONE: CPT | Performed by: INTERNAL MEDICINE

## 2021-02-19 PROCEDURE — 84445 ASSAY OF TSI GLOBULIN: CPT | Mod: 90 | Performed by: INTERNAL MEDICINE

## 2021-02-19 PROCEDURE — 86800 THYROGLOBULIN ANTIBODY: CPT | Performed by: INTERNAL MEDICINE

## 2021-02-19 PROCEDURE — 99000 SPECIMEN HANDLING OFFICE-LAB: CPT | Performed by: INTERNAL MEDICINE

## 2021-02-19 PROCEDURE — 99213 OFFICE O/P EST LOW 20 MIN: CPT | Performed by: PHYSICIAN ASSISTANT

## 2021-02-19 PROCEDURE — 36415 COLL VENOUS BLD VENIPUNCTURE: CPT | Performed by: INTERNAL MEDICINE

## 2021-02-19 PROCEDURE — 86376 MICROSOMAL ANTIBODY EACH: CPT | Performed by: INTERNAL MEDICINE

## 2021-02-19 NOTE — PROGRESS NOTES
Assessment/Plan:    Symptoms c/w acute sinusitis, suspect bacterial etiology due to symptom duration. Rx Augmentin. Continue Flonase, Mucinex also recommended.  Unremarkable neuro exam. Left middle ear effusion. Dizziness felt to be related to this and not due to central lesion. Flonase should help with this. Return to clinic for new/worsening symptoms.  See patient instructions below.    At the end of the encounter, I discussed results, diagnosis, medications. Discussed red flags for immediate return to clinic/ER, as well as indications for follow up if no improvement. Patient understood and agreed to plan. Patient was stable for discharge.      ICD-10-CM    1. Acute sinusitis with symptoms > 10 days  J01.90 amoxicillin-clavulanate (AUGMENTIN) 875-125 MG tablet         Return in about 1 week (around 2/26/2021) for Follow up w/ primary care provider if not better.    DEYANIRA Bansal, TESS  [facility]  -----------------------------------------------------------------------------------------------------------------------------------------------------    HPI:  Sruthi Escobar is a 55 year old female who presents for evaluation of sinus pressure and yellow-green foul smelling nasal discharge onset 2 months ago. She has been using ibuprofen and Richie's vapor rub without improvement. She just started using Flonase. In the last 3 days, she has started experiencing intermittent dizziness as well. This feels as if the room is spinning around her and improves if she lies down and closes her eyes. She reports she has had this happen once before when she had a sinus infection. Patient reports no fever/chills, headache, chest pain, shortness of breath, palpitations, abdominal pain, nausea, vomiting, diarrhea, rash, ear pain, tinnitus, hearing loss, focal weakness, vision changes, presyncope, syncope, or any other symptoms.     Past Medical History:   Diagnosis Date     Anemia       Bronchiectasis (H)      Chronic tonsillitis      Gastroesophageal reflux disease      Hypertension      Migraines      Pain in shoulder region, left      Thyroid disease        Vitals:    02/19/21 1512   BP: 138/86   Pulse: 84   Resp: 14   Temp: 98.6  F (37  C)   TempSrc: Oral   SpO2: 98%       Physical Exam  Vitals signs and nursing note reviewed.   HENT:      Right Ear: Tympanic membrane and external ear normal.      Left Ear: External ear normal. A middle ear effusion is present.      Nose: Mucosal edema present.      Right Sinus: Maxillary sinus tenderness and frontal sinus tenderness present.      Mouth/Throat:      Mouth: Mucous membranes are moist.      Pharynx: Oropharynx is clear.   Eyes:      Extraocular Movements: Extraocular movements intact.      Pupils: Pupils are equal, round, and reactive to light.   Pulmonary:      Effort: Pulmonary effort is normal.   Neurological:      Mental Status: She is alert.      Cranial Nerves: Cranial nerves are intact.      Sensory: Sensation is intact.      Motor: Motor function is intact.      Coordination: Coordination is intact.      Gait: Gait is intact.         Labs/Imaging:  No results found for this or any previous visit (from the past 24 hour(s)).      Patient Instructions   1.  Take antibiotic according to instructions, with food to prevent stomach upset. If you are prone to stomach upset with antibiotics, I recommend adding a probiotic to this regimen.  Culturelle is a trusted brand.  2.  I recommend using Mucinex to help thin mucus secretions.  Adding a nasal steroid spray such as Flonase can also be helpful with clearing sinus congestion.  3.  Take Tylenol 650mg every 4 hours or ibuprofen 600mg every 6 hours by mouth for pain/fever.  Do not exceed 4000mg of acetaminophen or 2400mg of ibuprofen from any source in a 24 hour period.  Taking Tylenol and ibuprofen together may be helpful in reducing pain.   4.  Follow-up if you not having any improvement in your  symptoms over the next 5 days.    Sinusitis  The sinuses are air-filled spaces within the bones of the face. They connect to the inside of the nose. Sinusitis is an inflammation of the tissue that lines the sinuses. Sinusitis can occur during a cold. It can also happen due to allergies to pollens and other particles in the air. Sinusitis can cause symptoms of sinus congestion and a feeling of fullness. A sinus infection causes fever, headache, and facial pain. There is often green or yellow fluid draining from the nose or into the back of the throat (post-nasal drip). You have been given antibiotics to treat this condition.  Home care    Take the full course of antibiotics as instructed. Do not stop taking them, even when you feel better.    Drink plenty of water, hot tea, and other liquids. This may help thin nasal mucus. It also may help your sinuses drain fluids.    Heat may help soothe painful areas of your face. Use a towel soaked in hot water. Or,  the shower and direct the warm spray onto your face. Using a vaporizer along with a menthol rub at night may also help soothe symptoms.     An expectorant with guaifenesin may help thin nasal mucus and help your sinuses drain fluids.    You can use an over-the-counter decongestant, unless a similar medicine was prescribed to you. Nasal sprays work the fastest. Use one that contains phenylephrine or oxymetazoline. First blow your nose gently. Then use the spray. Do not use these medicines more often than directed on the label. If you do, your symptoms may get worse. You may also take pills that contain pseudoephedrine. Don t use products that combine multiple medicines. This is because side effects may be increased. Read labels. You can also ask the pharmacist for help. (People with high blood pressure should not use decongestants. They can raise blood pressure.)    Over-the-counter antihistamines may help if allergies contributed to your sinusitis.      Do  not use nasal rinses or irrigation during an acute sinus infection, unless your healthcare provider tells you to. Rinsing may spread the infection to other areas in your sinuses.    Use acetaminophen or ibuprofen to control pain, unless another pain medicine was prescribed to you. If you have chronic liver or kidney disease or ever had a stomach ulcer, talk with your healthcare provider before using these medicines. (Aspirin should never be taken by anyone under age 18 who is ill with a fever. It may cause severe liver damage.)    Don't smoke. This can make symptoms worse.  Follow-up care  Follow up with your healthcare provider or our staff if you are better in 1 week.  When to seek medical advice  Call your healthcare provider if any of these occur:    Facial pain or headache that gets worse    Stiff neck    Unusual drowsiness or confusion    Swelling of your forehead or eyelids    Vision problems, such as blurred or double vision    Fever of 100.4 F (38 C) or higher, or as directed by your healthcare provider    Seizure    Breathing problems    Symptoms don't go away in 10 days  Prevention  Here are steps you can take to help prevent an infection:    Keep good hand washing habits.    Don t have close contact with people who have sore throats, colds, or other upper respiratory infections.    Don t smoke, and stay away from secondhand smoke.    Stay up to date with of your vaccines.  Date Last Reviewed: 11/1/2017 2000-2017 [employer]. [address] [place] [address], [place] [address]. All rights reserved. This information is not intended as a substitute for professional medical care. Always follow your healthcare professional's instructions.

## 2021-02-19 NOTE — PATIENT INSTRUCTIONS
1.  Take antibiotic according to instructions, with food to prevent stomach upset. If you are prone to stomach upset with antibiotics, I recommend adding a probiotic to this regimen.  Culturelle is a trusted brand.  2.  I recommend using Mucinex to help thin mucus secretions.  Adding a nasal steroid spray such as Flonase can also be helpful with clearing sinus congestion.  3.  Take Tylenol 650mg every 4 hours or ibuprofen 600mg every 6 hours by mouth for pain/fever.  Do not exceed 4000mg of acetaminophen or 2400mg of ibuprofen from any source in a 24 hour period.  Taking Tylenol and ibuprofen together may be helpful in reducing pain.   4.  Follow-up if you not having any improvement in your symptoms over the next 5 days.    Sinusitis  The sinuses are air-filled spaces within the bones of the face. They connect to the inside of the nose. Sinusitis is an inflammation of the tissue that lines the sinuses. Sinusitis can occur during a cold. It can also happen due to allergies to pollens and other particles in the air. Sinusitis can cause symptoms of sinus congestion and a feeling of fullness. A sinus infection causes fever, headache, and facial pain. There is often green or yellow fluid draining from the nose or into the back of the throat (post-nasal drip). You have been given antibiotics to treat this condition.  Home care    Take the full course of antibiotics as instructed. Do not stop taking them, even when you feel better.    Drink plenty of water, hot tea, and other liquids. This may help thin nasal mucus. It also may help your sinuses drain fluids.    Heat may help soothe painful areas of your face. Use a towel soaked in hot water. Or,  the shower and direct the warm spray onto your face. Using a vaporizer along with a menthol rub at night may also help soothe symptoms.     An expectorant with guaifenesin may help thin nasal mucus and help your sinuses drain fluids.    You can use an over-the-counter  decongestant, unless a similar medicine was prescribed to you. Nasal sprays work the fastest. Use one that contains phenylephrine or oxymetazoline. First blow your nose gently. Then use the spray. Do not use these medicines more often than directed on the label. If you do, your symptoms may get worse. You may also take pills that contain pseudoephedrine. Don t use products that combine multiple medicines. This is because side effects may be increased. Read labels. You can also ask the pharmacist for help. (People with high blood pressure should not use decongestants. They can raise blood pressure.)    Over-the-counter antihistamines may help if allergies contributed to your sinusitis.      Do not use nasal rinses or irrigation during an acute sinus infection, unless your healthcare provider tells you to. Rinsing may spread the infection to other areas in your sinuses.    Use acetaminophen or ibuprofen to control pain, unless another pain medicine was prescribed to you. If you have chronic liver or kidney disease or ever had a stomach ulcer, talk with your healthcare provider before using these medicines. (Aspirin should never be taken by anyone under age 18 who is ill with a fever. It may cause severe liver damage.)    Don't smoke. This can make symptoms worse.  Follow-up care  Follow up with your healthcare provider or our staff if you are better in 1 week.  When to seek medical advice  Call your healthcare provider if any of these occur:    Facial pain or headache that gets worse    Stiff neck    Unusual drowsiness or confusion    Swelling of your forehead or eyelids    Vision problems, such as blurred or double vision    Fever of 100.4 F (38 C) or higher, or as directed by your healthcare provider    Seizure    Breathing problems    Symptoms don't go away in 10 days  Prevention  Here are steps you can take to help prevent an infection:    Keep good hand washing habits.    Don t have close contact with people who  have sore throats, colds, or other upper respiratory infections.    Don t smoke, and stay away from secondhand smoke.    Stay up to date with of your vaccines.  Date Last Reviewed: 11/1/2017 2000-2017 [employer]. [address] [place] [address], [place] [address]. All rights reserved. This information is not intended as a substitute for professional medical care. Always follow your healthcare professional's instructions.

## 2021-02-20 LAB
T4 FREE SERPL-MCNC: 0.96 NG/DL (ref 0.76–1.46)
TSH SERPL DL<=0.005 MIU/L-ACNC: 1.96 MU/L (ref 0.4–4)

## 2021-02-22 LAB
THYROGLOB AB SERPL IA-ACNC: 62 IU/ML (ref 0–40)
THYROPEROXIDASE AB SERPL-ACNC: 4041 IU/ML

## 2021-02-23 NOTE — TELEPHONE ENCOUNTER
Called and left message for patient in regards to medication questions. Per Dr. Nguyen, patient should retrial Contrave at slower titration of dosing. Dr. Nguyen would not advise taking Metformin at this time, as it is not very effective for weight loss and has potential for GI side effects. Gave patient call back number to further discuss. MyChart message sent to patient as well.

## 2021-02-28 DIAGNOSIS — R73.03 PRE-DIABETES: Primary | ICD-10-CM

## 2021-02-28 PROCEDURE — 82530 CORTISOL FREE: CPT | Mod: 90 | Performed by: NURSE PRACTITIONER

## 2021-02-28 PROCEDURE — 99000 SPECIMEN HANDLING OFFICE-LAB: CPT | Performed by: NURSE PRACTITIONER

## 2021-03-02 LAB — TSI SER-ACNC: <1 TSI INDEX

## 2021-03-04 LAB
COLLECT DURATION TIME SPEC: 24 H
CORTIS F 24H UR HPLC-MCNC: 18 UG/L
CORTIS F 24H UR-MRATE: 41.4 UG/D
CORTIS F/CREAT 24H UR: 25.71 UG/G CRT
CREAT 24H UR-MRATE: 1610 MG/D (ref 500–1400)
CREAT UR-MCNC: 70 MG/DL
IMP & REVIEW OF LAB RESULTS: ABNORMAL
SPECIMEN VOL ?TM UR: 2300 ML

## 2021-03-26 ENCOUNTER — TELEPHONE (OUTPATIENT)
Dept: OTOLARYNGOLOGY | Facility: CLINIC | Age: 56
End: 2021-03-26

## 2021-03-26 NOTE — TELEPHONE ENCOUNTER
[facility] Call Center    Phone Message    May a detailed message be left on voicemail: yes     Reason for Call: Symptoms or Concerns     If patient has red-flag symptoms, warm transfer to triage line    Current symptom or concern: Sore throat; Halitosis    Symptoms have been present for:  Several week(s)    Has patient previously been seen for this? Yes    By: Dr. Kate    Date: 6/30/20    Are there any new or worsening symptoms? Yes: Sore throat; Halitosis      Action Taken: Message routed to:  [facility] ([facility]    Travel Screening: Not Applicable

## 2021-03-26 NOTE — TELEPHONE ENCOUNTER
Writer called patient spoke to her regarding recent call. She states she has dry mouth. She reports being prescribed cevimeline by rheumatology but states this has not helped. She also complains of bad breath. She states foods tend to get stuck on right side. She states she cannot go on like this.   Writer stated message would be sent to Dr. Kate for review. And we update her once recommendation is provided.     Patient was agreeable with plan.    Estelle Barfield LPN

## 2021-03-29 ENCOUNTER — TELEPHONE (OUTPATIENT)
Dept: OTOLARYNGOLOGY | Facility: CLINIC | Age: 56
End: 2021-03-29

## 2021-03-29 ENCOUNTER — MYC MEDICAL ADVICE (OUTPATIENT)
Dept: OTOLARYNGOLOGY | Facility: CLINIC | Age: 56
End: 2021-03-29

## 2021-03-29 DIAGNOSIS — R13.10 DYSPHAGIA: Primary | ICD-10-CM

## 2021-03-29 NOTE — TELEPHONE ENCOUNTER
LVM for patient to schedule a video swallow study per Dr. Kate in the next two weeks.     Left video swallow study number for scheduling.

## 2021-05-24 ENCOUNTER — RECORDS - HEALTHEAST (OUTPATIENT)
Dept: ADMINISTRATIVE | Facility: CLINIC | Age: 56
End: 2021-05-24

## 2021-05-25 ENCOUNTER — RECORDS - HEALTHEAST (OUTPATIENT)
Dept: ADMINISTRATIVE | Facility: CLINIC | Age: 56
End: 2021-05-25

## 2021-05-26 PROBLEM — M25.519 SHOULDER PAIN: Status: RESOLVED | Noted: 2020-09-21 | Resolved: 2021-05-26

## 2021-05-26 NOTE — PROGRESS NOTES
Subjective:  HPI  Physical Exam                    Objective:  System    Physical Exam    General     ROS    Assessment/Plan:    DISCHARGE REPORT    Progress reporting period is from 9/18/20 to 10/9/20.     SUBJECTIVE  Subjective: Shoulder feels a lot better. Exercises have been helping. Muscles feel like they get tired with HEP.    Current Pain level: 2/10   Initial Pain level: 7/10   Changes in function: Yes, see goal flow sheet for change in function   Adverse reactions: None;   ,     Patient has failed to return to therapy so current objective findings are unknown.  The subjective and objective information are from the last SOAP note on this patient.    OBJECTIVE  Objective: Fatigues quickly with HEP.   Pt did not return so no further objective info obtained      ASSESSMENT/PLAN  Diagnosis 1:  L UT/shoulder pain  Pain -  hot/cold therapy, manual therapy, self management, education and home program  Decreased ROM/flexibility - manual therapy, therapeutic exercise and home program  Decreased strength - therapeutic exercise, therapeutic activities and home program  STG/LTGs have been met or progress has been made towards goals:  Yes (See Goal flow sheet completed today.)  Assessment of Progress: The patient has not returned to therapy. Current status is unknown.  Self Management Plans:  Patient has been instructed in a home treatment program.  Patient  has been instructed in self management of symptoms.  Sruthi continues to require the following intervention to meet STG and LTG's: PT intervention is no longer required to meet STG/LTG.  The patient failed to complete scheduled/ordered appointments so current information is unknown.  We will discharge this patient from PT.    Recommendations:  This patient is ready to be discharged from therapy and continue their home treatment program.    Please refer to the daily flowsheet for treatment today, total treatment time and time spent performing 1:1 timed codes.

## 2021-05-28 ENCOUNTER — RECORDS - HEALTHEAST (OUTPATIENT)
Dept: ADMINISTRATIVE | Facility: CLINIC | Age: 56
End: 2021-05-28

## 2021-05-29 ENCOUNTER — RECORDS - HEALTHEAST (OUTPATIENT)
Dept: ADMINISTRATIVE | Facility: CLINIC | Age: 56
End: 2021-05-29

## 2021-05-31 ENCOUNTER — RECORDS - HEALTHEAST (OUTPATIENT)
Dept: ADMINISTRATIVE | Facility: CLINIC | Age: 56
End: 2021-05-31

## 2021-06-01 ENCOUNTER — RECORDS - HEALTHEAST (OUTPATIENT)
Dept: ADMINISTRATIVE | Facility: CLINIC | Age: 56
End: 2021-06-01

## 2021-06-02 ENCOUNTER — RECORDS - HEALTHEAST (OUTPATIENT)
Dept: ADMINISTRATIVE | Facility: CLINIC | Age: 56
End: 2021-06-02

## 2021-06-05 ENCOUNTER — HEALTH MAINTENANCE LETTER (OUTPATIENT)
Age: 56
End: 2021-06-05

## 2021-07-01 ENCOUNTER — TELEPHONE (OUTPATIENT)
Dept: ENDOCRINOLOGY | Facility: CLINIC | Age: 56
End: 2021-07-01

## 2021-07-01 NOTE — TELEPHONE ENCOUNTER
LVMx1 to schedule follow up with Bertha Nguyen. Left [facility] number and sent MyC.    Put under [facility] Diabetes department. IS actually a part of the [facility].

## 2021-07-13 ENCOUNTER — RECORDS - HEALTHEAST (OUTPATIENT)
Dept: ADMINISTRATIVE | Facility: CLINIC | Age: 56
End: 2021-07-13

## 2021-07-21 ENCOUNTER — RECORDS - HEALTHEAST (OUTPATIENT)
Dept: ADMINISTRATIVE | Facility: CLINIC | Age: 56
End: 2021-07-21

## 2021-09-25 ENCOUNTER — HEALTH MAINTENANCE LETTER (OUTPATIENT)
Age: 56
End: 2021-09-25

## 2022-06-26 ENCOUNTER — HEALTH MAINTENANCE LETTER (OUTPATIENT)
Age: 57
End: 2022-06-26

## 2022-12-26 ENCOUNTER — HEALTH MAINTENANCE LETTER (OUTPATIENT)
Age: 57
End: 2022-12-26

## 2023-07-09 ENCOUNTER — HEALTH MAINTENANCE LETTER (OUTPATIENT)
Age: 58
End: 2023-07-09

## (undated) DEVICE — JELLY LUBRICATING SURGILUBE 2OZ TUBE

## (undated) DEVICE — RAD KNIFE HANDLE W/11 BLADE DISPOSABLE 371611

## (undated) DEVICE — SOL NACL 0.9% IRRIG 1000ML BOTTLE 2F7124

## (undated) DEVICE — SUCTION MANIFOLD NEPTUNE 2 SYS 1 PORT 702-025-000

## (undated) DEVICE — SYR 50ML LL W/O NDL 309653

## (undated) DEVICE — SYR 10ML FINGER CONTROL W/O NDL 309695

## (undated) DEVICE — TOOTHBRUSH ADULT NON STERILE MDS136850

## (undated) DEVICE — PACK ENT ENDOSCOPY CUSTOM ASC

## (undated) DEVICE — LINEN TOWEL PACK X5 5464

## (undated) DEVICE — TUBING IV EXTENSION SET 60" HI FLOW 2N3349

## (undated) DEVICE — PUMP SYSTEM SINGLE ACTION M0067201000

## (undated) DEVICE — GLOVE PROTEXIS POWDER FREE SMT 8.0  2D72PT80X

## (undated) RX ORDER — LIDOCAINE HYDROCHLORIDE 20 MG/ML
INJECTION, SOLUTION EPIDURAL; INFILTRATION; INTRACAUDAL; PERINEURAL
Status: DISPENSED
Start: 2020-01-20

## (undated) RX ORDER — PROPOFOL 10 MG/ML
INJECTION, EMULSION INTRAVENOUS
Status: DISPENSED
Start: 2020-01-20

## (undated) RX ORDER — DEXAMETHASONE SODIUM PHOSPHATE 4 MG/ML
INJECTION, SOLUTION INTRA-ARTICULAR; INTRALESIONAL; INTRAMUSCULAR; INTRAVENOUS; SOFT TISSUE
Status: DISPENSED
Start: 2020-01-20

## (undated) RX ORDER — GABAPENTIN 300 MG/1
CAPSULE ORAL
Status: DISPENSED
Start: 2020-01-20

## (undated) RX ORDER — AMPICILLIN AND SULBACTAM 2; 1 G/1; G/1
INJECTION, POWDER, FOR SOLUTION INTRAMUSCULAR; INTRAVENOUS
Status: DISPENSED
Start: 2020-01-20

## (undated) RX ORDER — ONDANSETRON 2 MG/ML
INJECTION INTRAMUSCULAR; INTRAVENOUS
Status: DISPENSED
Start: 2020-01-20

## (undated) RX ORDER — FENTANYL CITRATE 50 UG/ML
INJECTION, SOLUTION INTRAMUSCULAR; INTRAVENOUS
Status: DISPENSED
Start: 2020-01-20

## (undated) RX ORDER — ACETAMINOPHEN 325 MG/1
TABLET ORAL
Status: DISPENSED
Start: 2020-01-20